# Patient Record
Sex: MALE | Race: BLACK OR AFRICAN AMERICAN | Employment: FULL TIME | URBAN - METROPOLITAN AREA
[De-identification: names, ages, dates, MRNs, and addresses within clinical notes are randomized per-mention and may not be internally consistent; named-entity substitution may affect disease eponyms.]

---

## 2022-07-23 ENCOUNTER — HOSPITAL ENCOUNTER (INPATIENT)
Age: 32
LOS: 2 days | Discharge: HOME | DRG: 139 | End: 2022-07-27
Attending: EMERGENCY MEDICINE | Admitting: INTERNAL MEDICINE
Payer: MEDICAID

## 2022-07-23 ENCOUNTER — APPOINTMENT (OUTPATIENT)
Dept: GENERAL RADIOLOGY | Age: 32
DRG: 139 | End: 2022-07-23
Payer: MEDICAID

## 2022-07-23 DIAGNOSIS — K92.1 MELENA: ICD-10-CM

## 2022-07-23 DIAGNOSIS — A41.9 SEPTICEMIA (HCC): ICD-10-CM

## 2022-07-23 DIAGNOSIS — J18.9 PNEUMONIA OF BOTH LUNGS DUE TO INFECTIOUS ORGANISM, UNSPECIFIED PART OF LUNG: Primary | ICD-10-CM

## 2022-07-23 PROBLEM — E87.1 HYPONATREMIA: Status: ACTIVE | Noted: 2022-07-23

## 2022-07-23 PROBLEM — R79.89 ELEVATED LFTS: Status: ACTIVE | Noted: 2022-07-23

## 2022-07-23 LAB
ABSOLUTE EOS #: 0 K/UL (ref 0–0.4)
ABSOLUTE IMMATURE GRANULOCYTE: 0.27 K/UL (ref 0–0.3)
ABSOLUTE LYMPH #: 0.89 K/UL (ref 1–4.8)
ABSOLUTE MONO #: 0.36 K/UL (ref 0.2–0.8)
ALBUMIN SERPL-MCNC: 2.7 G/DL (ref 3.5–5.2)
ALP BLD-CCNC: 64 U/L (ref 40–129)
ALT SERPL-CCNC: 68 U/L (ref 5–41)
ANION GAP SERPL CALCULATED.3IONS-SCNC: 13 MMOL/L (ref 9–17)
ANION GAP SERPL CALCULATED.3IONS-SCNC: 15 MMOL/L (ref 9–17)
AST SERPL-CCNC: 203 U/L
BASOPHILS # BLD: 0 %
BASOPHILS ABSOLUTE: 0 K/UL (ref 0–0.2)
BILIRUB SERPL-MCNC: 0.96 MG/DL (ref 0.3–1.2)
BILIRUBIN DIRECT: 0.65 MG/DL
BILIRUBIN, INDIRECT: 0.31 MG/DL (ref 0–1)
BUN BLDV-MCNC: 22 MG/DL (ref 6–20)
BUN BLDV-MCNC: 26 MG/DL (ref 6–20)
BUN/CREAT BLD: 22 (ref 9–20)
BUN/CREAT BLD: 23 (ref 9–20)
CALCIUM SERPL-MCNC: 7.9 MG/DL (ref 8.6–10.4)
CALCIUM SERPL-MCNC: 8.2 MG/DL (ref 8.6–10.4)
CHLORIDE BLD-SCNC: 92 MMOL/L (ref 98–107)
CHLORIDE BLD-SCNC: 96 MMOL/L (ref 98–107)
CO2: 17 MMOL/L (ref 20–31)
CO2: 20 MMOL/L (ref 20–31)
CREAT SERPL-MCNC: 0.97 MG/DL (ref 0.7–1.2)
CREAT SERPL-MCNC: 1.16 MG/DL (ref 0.7–1.2)
EOSINOPHILS RELATIVE PERCENT: 0 % (ref 1–4)
GFR AFRICAN AMERICAN: >60 ML/MIN
GFR AFRICAN AMERICAN: >60 ML/MIN
GFR NON-AFRICAN AMERICAN: >60 ML/MIN
GFR NON-AFRICAN AMERICAN: >60 ML/MIN
GFR SERPL CREATININE-BSD FRML MDRD: ABNORMAL ML/MIN/{1.73_M2}
GFR SERPL CREATININE-BSD FRML MDRD: ABNORMAL ML/MIN/{1.73_M2}
GLUCOSE BLD-MCNC: 111 MG/DL (ref 70–99)
GLUCOSE BLD-MCNC: 121 MG/DL (ref 70–99)
HCT VFR BLD CALC: 38.4 % (ref 40.7–50.3)
HEMOGLOBIN: 12.7 G/DL (ref 13–17)
IMMATURE GRANULOCYTES: 3 %
LACTIC ACID, SEPSIS: 1 MMOL/L (ref 0.5–1.9)
LACTIC ACID, SEPSIS: 1.4 MMOL/L (ref 0.5–1.9)
LIPASE: 16 U/L (ref 13–60)
LYMPHOCYTES # BLD: 10 % (ref 24–44)
MCH RBC QN AUTO: 26.3 PG (ref 25.2–33.5)
MCHC RBC AUTO-ENTMCNC: 33.1 G/DL (ref 28.4–34.8)
MCV RBC AUTO: 79.5 FL (ref 82.6–102.9)
MONOCYTES # BLD: 4 % (ref 1–7)
MONONUCLEOSIS SCREEN: NEGATIVE
MORPHOLOGY: ABNORMAL
MORPHOLOGY: ABNORMAL
NRBC AUTOMATED: 0 PER 100 WBC
PDW BLD-RTO: 13.8 % (ref 11.8–14.4)
PLATELET # BLD: 115 K/UL (ref 138–453)
PMV BLD AUTO: 11.1 FL (ref 8.1–13.5)
POTASSIUM SERPL-SCNC: 3.7 MMOL/L (ref 3.7–5.3)
POTASSIUM SERPL-SCNC: 3.9 MMOL/L (ref 3.7–5.3)
RBC # BLD: 4.83 M/UL (ref 4.21–5.77)
S PYO AG THROAT QL: NEGATIVE
SARS-COV-2, RAPID: NOT DETECTED
SEG NEUTROPHILS: 83 % (ref 36–66)
SEGMENTED NEUTROPHILS ABSOLUTE COUNT: 7.38 K/UL (ref 1.8–7.7)
SODIUM BLD-SCNC: 124 MMOL/L (ref 135–144)
SODIUM BLD-SCNC: 129 MMOL/L (ref 135–144)
SOURCE: NORMAL
SPECIMEN DESCRIPTION: NORMAL
TOTAL PROTEIN: 6.8 G/DL (ref 6.4–8.3)
TROPONIN, HIGH SENSITIVITY: 7 NG/L (ref 0–22)
TROPONIN, HIGH SENSITIVITY: 8 NG/L (ref 0–22)
WBC # BLD: 8.9 K/UL (ref 3.5–11.3)

## 2022-07-23 PROCEDURE — G0378 HOSPITAL OBSERVATION PER HR: HCPCS

## 2022-07-23 PROCEDURE — 6360000002 HC RX W HCPCS: Performed by: EMERGENCY MEDICINE

## 2022-07-23 PROCEDURE — 99222 1ST HOSP IP/OBS MODERATE 55: CPT | Performed by: NURSE PRACTITIONER

## 2022-07-23 PROCEDURE — 96361 HYDRATE IV INFUSION ADD-ON: CPT

## 2022-07-23 PROCEDURE — 6370000000 HC RX 637 (ALT 250 FOR IP): Performed by: NURSE PRACTITIONER

## 2022-07-23 PROCEDURE — 6360000002 HC RX W HCPCS: Performed by: NURSE PRACTITIONER

## 2022-07-23 PROCEDURE — 87635 SARS-COV-2 COVID-19 AMP PRB: CPT

## 2022-07-23 PROCEDURE — G0378 HOSPITAL OBSERVATION PER HR: HCPCS | Performed by: INTERNAL MEDICINE

## 2022-07-23 PROCEDURE — 96375 TX/PRO/DX INJ NEW DRUG ADDON: CPT

## 2022-07-23 PROCEDURE — 87880 STREP A ASSAY W/OPTIC: CPT

## 2022-07-23 PROCEDURE — 96365 THER/PROPH/DIAG IV INF INIT: CPT

## 2022-07-23 PROCEDURE — 6370000000 HC RX 637 (ALT 250 FOR IP): Performed by: EMERGENCY MEDICINE

## 2022-07-23 PROCEDURE — 85025 COMPLETE CBC W/AUTO DIFF WBC: CPT

## 2022-07-23 PROCEDURE — 84484 ASSAY OF TROPONIN QUANT: CPT

## 2022-07-23 PROCEDURE — 96368 THER/DIAG CONCURRENT INF: CPT

## 2022-07-23 PROCEDURE — 99285 EMERGENCY DEPT VISIT HI MDM: CPT

## 2022-07-23 PROCEDURE — 71045 X-RAY EXAM CHEST 1 VIEW: CPT

## 2022-07-23 PROCEDURE — 93005 ELECTROCARDIOGRAM TRACING: CPT | Performed by: EMERGENCY MEDICINE

## 2022-07-23 PROCEDURE — 83605 ASSAY OF LACTIC ACID: CPT

## 2022-07-23 PROCEDURE — 2580000003 HC RX 258: Performed by: NURSE PRACTITIONER

## 2022-07-23 PROCEDURE — 86308 HETEROPHILE ANTIBODY SCREEN: CPT

## 2022-07-23 PROCEDURE — 36415 COLL VENOUS BLD VENIPUNCTURE: CPT

## 2022-07-23 PROCEDURE — 2580000003 HC RX 258: Performed by: EMERGENCY MEDICINE

## 2022-07-23 PROCEDURE — 80048 BASIC METABOLIC PNL TOTAL CA: CPT

## 2022-07-23 PROCEDURE — 87040 BLOOD CULTURE FOR BACTERIA: CPT

## 2022-07-23 PROCEDURE — 96374 THER/PROPH/DIAG INJ IV PUSH: CPT

## 2022-07-23 PROCEDURE — 94640 AIRWAY INHALATION TREATMENT: CPT

## 2022-07-23 PROCEDURE — 80076 HEPATIC FUNCTION PANEL: CPT

## 2022-07-23 PROCEDURE — 83690 ASSAY OF LIPASE: CPT

## 2022-07-23 PROCEDURE — 96372 THER/PROPH/DIAG INJ SC/IM: CPT

## 2022-07-23 RX ORDER — BENZONATATE 100 MG/1
100 CAPSULE ORAL 3 TIMES DAILY PRN
Status: DISCONTINUED | OUTPATIENT
Start: 2022-07-23 | End: 2022-07-27 | Stop reason: HOSPADM

## 2022-07-23 RX ORDER — ACETAMINOPHEN 325 MG/1
650 TABLET ORAL EVERY 4 HOURS PRN
Status: DISCONTINUED | OUTPATIENT
Start: 2022-07-23 | End: 2022-07-27 | Stop reason: HOSPADM

## 2022-07-23 RX ORDER — IPRATROPIUM BROMIDE AND ALBUTEROL SULFATE 2.5; .5 MG/3ML; MG/3ML
1 SOLUTION RESPIRATORY (INHALATION) 2 TIMES DAILY
Status: DISCONTINUED | OUTPATIENT
Start: 2022-07-24 | End: 2022-07-27 | Stop reason: HOSPADM

## 2022-07-23 RX ORDER — SODIUM CHLORIDE 9 MG/ML
INJECTION, SOLUTION INTRAVENOUS CONTINUOUS
Status: DISCONTINUED | OUTPATIENT
Start: 2022-07-23 | End: 2022-07-27 | Stop reason: HOSPADM

## 2022-07-23 RX ORDER — SODIUM CHLORIDE 9 MG/ML
INJECTION, SOLUTION INTRAVENOUS PRN
Status: DISCONTINUED | OUTPATIENT
Start: 2022-07-23 | End: 2022-07-27 | Stop reason: HOSPADM

## 2022-07-23 RX ORDER — GUAIFENESIN 600 MG/1
600 TABLET, EXTENDED RELEASE ORAL 2 TIMES DAILY
Status: DISCONTINUED | OUTPATIENT
Start: 2022-07-23 | End: 2022-07-27 | Stop reason: HOSPADM

## 2022-07-23 RX ORDER — SODIUM CHLORIDE 0.9 % (FLUSH) 0.9 %
5-40 SYRINGE (ML) INJECTION EVERY 12 HOURS SCHEDULED
Status: DISCONTINUED | OUTPATIENT
Start: 2022-07-23 | End: 2022-07-27 | Stop reason: HOSPADM

## 2022-07-23 RX ORDER — ONDANSETRON 4 MG/1
4 TABLET, ORALLY DISINTEGRATING ORAL EVERY 8 HOURS PRN
Status: DISCONTINUED | OUTPATIENT
Start: 2022-07-23 | End: 2022-07-27 | Stop reason: HOSPADM

## 2022-07-23 RX ORDER — ACETAMINOPHEN 650 MG/1
650 SUPPOSITORY RECTAL EVERY 6 HOURS PRN
Status: DISCONTINUED | OUTPATIENT
Start: 2022-07-23 | End: 2022-07-27 | Stop reason: HOSPADM

## 2022-07-23 RX ORDER — ALBUTEROL SULFATE 2.5 MG/3ML
2.5 SOLUTION RESPIRATORY (INHALATION)
Status: DISCONTINUED | OUTPATIENT
Start: 2022-07-23 | End: 2022-07-27 | Stop reason: HOSPADM

## 2022-07-23 RX ORDER — ONDANSETRON 2 MG/ML
4 INJECTION INTRAMUSCULAR; INTRAVENOUS ONCE
Status: COMPLETED | OUTPATIENT
Start: 2022-07-23 | End: 2022-07-23

## 2022-07-23 RX ORDER — 0.9 % SODIUM CHLORIDE 0.9 %
1000 INTRAVENOUS SOLUTION INTRAVENOUS ONCE
Status: COMPLETED | OUTPATIENT
Start: 2022-07-23 | End: 2022-07-23

## 2022-07-23 RX ORDER — SODIUM CHLORIDE 0.9 % (FLUSH) 0.9 %
10 SYRINGE (ML) INJECTION PRN
Status: DISCONTINUED | OUTPATIENT
Start: 2022-07-23 | End: 2022-07-27 | Stop reason: HOSPADM

## 2022-07-23 RX ORDER — AZITHROMYCIN 250 MG/1
500 TABLET, FILM COATED ORAL EVERY 24 HOURS
Status: COMPLETED | OUTPATIENT
Start: 2022-07-24 | End: 2022-07-26

## 2022-07-23 RX ORDER — ONDANSETRON 2 MG/ML
4 INJECTION INTRAMUSCULAR; INTRAVENOUS EVERY 6 HOURS PRN
Status: DISCONTINUED | OUTPATIENT
Start: 2022-07-23 | End: 2022-07-27 | Stop reason: HOSPADM

## 2022-07-23 RX ORDER — ENOXAPARIN SODIUM 100 MG/ML
40 INJECTION SUBCUTANEOUS DAILY
Status: DISCONTINUED | OUTPATIENT
Start: 2022-07-23 | End: 2022-07-27 | Stop reason: HOSPADM

## 2022-07-23 RX ORDER — IPRATROPIUM BROMIDE AND ALBUTEROL SULFATE 2.5; .5 MG/3ML; MG/3ML
1 SOLUTION RESPIRATORY (INHALATION)
Status: DISCONTINUED | OUTPATIENT
Start: 2022-07-24 | End: 2022-07-23

## 2022-07-23 RX ORDER — ACETAMINOPHEN 500 MG
1000 TABLET ORAL ONCE
Status: COMPLETED | OUTPATIENT
Start: 2022-07-23 | End: 2022-07-23

## 2022-07-23 RX ADMIN — AZITHROMYCIN MONOHYDRATE 500 MG: 500 INJECTION, POWDER, LYOPHILIZED, FOR SOLUTION INTRAVENOUS at 17:39

## 2022-07-23 RX ADMIN — SODIUM CHLORIDE 1000 ML: 9 INJECTION, SOLUTION INTRAVENOUS at 15:46

## 2022-07-23 RX ADMIN — GUAIFENESIN 600 MG: 600 TABLET ORAL at 20:54

## 2022-07-23 RX ADMIN — ENOXAPARIN SODIUM 40 MG: 100 INJECTION SUBCUTANEOUS at 20:54

## 2022-07-23 RX ADMIN — CEFTRIAXONE SODIUM 1000 MG: 1 INJECTION, POWDER, FOR SOLUTION INTRAMUSCULAR; INTRAVENOUS at 16:48

## 2022-07-23 RX ADMIN — ACETAMINOPHEN 1000 MG: 500 TABLET ORAL at 15:40

## 2022-07-23 RX ADMIN — SODIUM CHLORIDE: 9 INJECTION, SOLUTION INTRAVENOUS at 21:00

## 2022-07-23 RX ADMIN — SODIUM CHLORIDE, PRESERVATIVE FREE 10 ML: 5 INJECTION INTRAVENOUS at 20:55

## 2022-07-23 RX ADMIN — ONDANSETRON 4 MG: 2 INJECTION INTRAMUSCULAR; INTRAVENOUS at 15:47

## 2022-07-23 RX ADMIN — ACETAMINOPHEN 650 MG: 325 TABLET ORAL at 23:21

## 2022-07-23 RX ADMIN — ALBUTEROL SULFATE 2.5 MG: 2.5 SOLUTION RESPIRATORY (INHALATION) at 20:50

## 2022-07-23 ASSESSMENT — ENCOUNTER SYMPTOMS
BACK PAIN: 1
SORE THROAT: 0
COUGH: 1
SHORTNESS OF BREATH: 1
NAUSEA: 0
EYE REDNESS: 0
DIARRHEA: 0
EYE DISCHARGE: 0
VOMITING: 0
RHINORRHEA: 0
ABDOMINAL PAIN: 0
VOMITING: 1
COLOR CHANGE: 0
CONSTIPATION: 0
NAUSEA: 1
CHEST TIGHTNESS: 0
SHORTNESS OF BREATH: 0

## 2022-07-23 ASSESSMENT — PAIN DESCRIPTION - LOCATION: LOCATION: BACK;ABDOMEN;HEAD

## 2022-07-23 ASSESSMENT — PAIN SCALES - GENERAL
PAINLEVEL_OUTOF10: 10
PAINLEVEL_OUTOF10: 9
PAINLEVEL_OUTOF10: 10

## 2022-07-23 ASSESSMENT — PAIN - FUNCTIONAL ASSESSMENT: PAIN_FUNCTIONAL_ASSESSMENT: 0-10

## 2022-07-23 NOTE — ED PROVIDER NOTES
EMERGENCY DEPARTMENT ENCOUNTER    Pt Name: Brennon Finn  MRN: [id number]  [place] 1990  Date of evaluation: 7/23/22  CHIEF COMPLAINT       Chief Complaint   Patient presents with    Cough    Shortness of Breath    Headache    Fever     HISTORY OF PRESENT ILLNESS   This is a 33year-old that presents with complaints of cough, fever chills body aches and generally not feeling well. Patient states that he was seen a few days ago at another facility, he had a COVID swab and some other blood work that was performed. Patient was negative for influenza. He denies drug abuse. He states that he continues to feel ill, cough sputum production, sore throat congestion and just generally not feeling well. REVIEW OF SYSTEMS     Review of Systems   Constitutional:  Positive for chills, fatigue and fever. HENT:  Negative for rhinorrhea and sore throat. Eyes:  Negative for discharge, redness and visual disturbance. Respiratory:  Positive for cough and shortness of breath. Cardiovascular:  Negative for chest pain, palpitations and leg swelling. Gastrointestinal:  Negative for diarrhea, nausea and vomiting. Genitourinary:  Negative for dysuria and hematuria. Musculoskeletal:  Negative for arthralgias, myalgias and neck pain. Skin:  Negative for color change and rash. Neurological:  Negative for seizures, weakness and headaches. Psychiatric/Behavioral:  Negative for hallucinations, self-injury and suicidal ideas. PASTMEDICAL HISTORY   History reviewed. No pertinent past medical history. Past Problem List  Patient Active Problem List   Diagnosis Code    Multifocal pneumonia J18.9     SURGICAL HISTORY     History reviewed. No pertinent surgical history. CURRENT MEDICATIONS       Previous Medications    No medications on file     ALLERGIES     has No Known Allergies. FAMILY HISTORY     has no family status information on file.       SOCIAL HISTORY       Social History     Tobacco Use Smoking status: Former     Types: Cigarettes    Smokeless tobacco: Never   Substance Use Topics    Alcohol use: Not Currently    Drug use: Not Currently     PHYSICAL EXAM     INITIAL VITALS: /82   Pulse (!) 127   Temp (!) 102 °F (38.9 °C) (Oral)   Resp 18   Ht 5' 7\" (1.702 m)   Wt 150 lb (68 kg)   SpO2 92%   BMI 23.49 kg/m²    Physical Exam  Constitutional:       Appearance: Normal appearance. He is well-developed. He is ill-appearing. He is not toxic-appearing. HENT:      Head: Normocephalic and atraumatic. Eyes:      Conjunctiva/sclera: Conjunctivae normal.      Pupils: Pupils are equal, round, and reactive to light. Neck:      Trachea: Trachea normal.   Cardiovascular:      Rate and Rhythm: Regular rhythm. Tachycardia present. Heart sounds: S1 normal and S2 normal. No murmur heard. Pulmonary:      Effort: Pulmonary effort is normal. Tachypnea present. No accessory muscle usage or respiratory distress. Breath sounds: Normal breath sounds. Chest:      Chest wall: No deformity or tenderness. Abdominal:      General: Bowel sounds are normal. There is no distension or abdominal bruit. Palpations: Abdomen is not rigid. Tenderness: There is no abdominal tenderness. There is no guarding or rebound. Musculoskeletal:      Cervical back: Normal range of motion and neck supple. Skin:     General: Skin is warm. Findings: No rash. Neurological:      Mental Status: He is alert and oriented to person, place, and time. GCS: GCS eye subscore is 4. GCS verbal subscore is 5. GCS motor subscore is 6. Psychiatric:         Speech: Speech normal.       MEDICAL DECISION MAKIN-year-old male presents with complaints of cough and shortness of breath, plan is basic labs lactic acid COVID swab and reevaluation. 4:49 PM EDT  Patient's x-ray shows evidence of a multifocal pneumonia, the patient is tachycardic and tachypneic, and meets sepsis criteria.   Patient's COVID was negative, plan is IV antibiotics blood cultures admission to the hospitalist and reevaluation. CRITICAL CARE:       PROCEDURES:    Critical Care    Date/Time: 7/23/2022 4:50 PM  Performed by: Ivis Felix MD  Authorized by: Dee Dumont MD     Critical care provider statement:     Critical care time (minutes):  36    Critical care time was exclusive of:  Separately billable procedures and treating other patients and teaching time    Critical care was necessary to treat or prevent imminent or life-threatening deterioration of the following conditions:  Sepsis    Critical care was time spent personally by me on the following activities:  Discussions with primary provider, evaluation of patient's response to treatment, ordering and review of laboratory studies and ordering and review of radiographic studies    DIAGNOSTIC RESULTS   EKG:All EKG's are interpreted by the Emergency Department Physician who either signs or Co-signs this chart in the absence of a cardiologist.    Patient's EKG shows sinus tachycardia with rate of 101 CT QRS QTC normal is unremarkable, the patient has normal axis no ST elevation or depressions, no significant T wave changes. Nonspecific EKG. RADIOLOGY:All plain film, CT, MRI, and formal ultrasound images (except ED bedside ultrasound) are read by the radiologist, see reports below, unless otherwisenoted in MDM or here. XR CHEST PORTABLE   Final Result   Extensive consolidation along the medial right lung, suspicious for   multifocal pneumonia. Recommend follow-up to resolution. LABS: All lab results were reviewed by myself, and all abnormals are listed below.   Labs Reviewed   CBC WITH AUTO DIFFERENTIAL - Abnormal; Notable for the following components:       Result Value    Hemoglobin 12.7 (*)     Hematocrit 38.4 (*)     MCV 79.5 (*)     Platelets 125 (*)     All other components within normal limits   BASIC METABOLIC PANEL - Abnormal; Notable for the following components:    Glucose 111 (*)     BUN 26 (*)     Bun/Cre Ratio 22 (*)     Calcium 8.2 (*)     Sodium 124 (*)     Chloride 92 (*)     CO2 17 (*)     All other components within normal limits   HEPATIC FUNCTION PANEL - Abnormal; Notable for the following components:    Albumin 2.7 (*)     ALT 68 (*)      (*)     Bilirubin, Direct 0.65 (*)     All other components within normal limits   STREP SCREEN GROUP A THROAT   COVID-19, RAPID   CULTURE, BLOOD 1   CULTURE, BLOOD 1   MONONUCLEOSIS SCREEN   LIPASE   TROPONIN   LACTATE, SEPSIS   LACTATE, SEPSIS   TROPONIN   TROPONIN   TROPONIN       EMERGENCY DEPARTMENTCOURSE:         Vitals:    Vitals:    07/23/22 1500 07/23/22 1649   BP: 135/82    Pulse: (!) 127    Resp: 18    Temp: (!) 102.5 °F (39.2 °C) (!) 102 °F (38.9 °C)   TempSrc:  Oral   SpO2: 92%    Weight: 150 lb (68 kg)    Height: 5' 7\" (1.702 m)        The patient was given the following medications while in the emergency department:  Orders Placed This Encounter   Medications    acetaminophen (TYLENOL) tablet 1,000 mg    ondansetron (ZOFRAN) injection 4 mg    0.9 % sodium chloride bolus    cefTRIAXone (ROCEPHIN) 1000 mg IVPB in 50 mL D5W minibag     Order Specific Question:   Antimicrobial Indications     Answer:   Pneumonia (CAP)    azithromycin (ZITHROMAX) 500 mg in D5W 250ml addavial     Order Specific Question:   Antimicrobial Indications     Answer:   Pneumonia (CAP)     CONSULTS:  IP CONSULT TO HOSPITALIST    FINAL IMPRESSION      1. Pneumonia of both lungs due to infectious organism, unspecified part of lung    2. Septicemia [facility])          DISPOSITION/PLAN   DISPOSITION Admitted 07/23/2022 04:45:30 PM      PATIENT REFERRED TO:  No follow-up provider specified. DISCHARGE MEDICATIONS:  New Prescriptions    No medications on file     The care is provided during an unprecedented national emergency due to the novel coronavirus, COVID 19.   MD Dilcia Neal, MD  07/23/22 3987

## 2022-07-23 NOTE — ED NOTES
Commode and wipes placed at bs; pt up to commode; denies dizzy/lightheadedness. Agrees to put call light on when done.       Mark Cortes RN  07/23/22 5145
Pt calls out using call light stating \"I feel like I'm gonna throw up\". Pt provided basin.       Jordy Rodriguez RN  07/23/22 9808
Pt given crackers and ice chips.      Leon Guerra, TARA  07/23/22 0613
Pt returned to bed from commode; tolerated well.       Che Miller RN  07/23/22 9061
Pt states full sensation in bilat feet again.       Jim Castillo, TARA  07/23/22 6261
Pt states toes of bilat feet just started to go numb. RN checked CMS; pt states slightly decreased sensation in bilat feet but otherwise CMS intact bilat.       Donovan Chaidez, RN  07/23/22 9132
Star Moore  07/23/22 TARA Wagner  07/23/22 1940

## 2022-07-23 NOTE — H&P
[facility]  Office: [address], DO, Katie Press, DO, Lina Haidering, DO, Justina Villalobos Blood, DO, Justine Duran MD, Kelsi Myers MD, Jossy Ty MD, Brent Segal MD,  Caprice Varghese MD, Sole Reynolds MD, Mriiam Diop, DO, Liborio Boss MD,  Alfreda Singh MD, Ale Wellington MD, Kelley Madera, DO, Salvatore Rodriguez MD, Lyly Sethi MD, Annabelle Bolaños MD, Marck Whipple, DO, Acacia Baum MD, Jamie Cota MD, Sherry Ramirez, CNP,  Lio Blocker, CNP, Gerard Rees, CNP, Madiha Case, CNP, Roylene Goodell, PA-C, Sherine Shah, DNP, Arelis Larry, CNP, Tala Prakash, CNP, Zuri Daniels, CNP, Tavia Cornea, CNP, Favian Calderón, CNS, Korin Hassan, [facility], Leonor Layne, CNP, Lydia Southern, CNP, Carvel Pro, CNP, Kim Elias, [facility]    HISTORY AND PHYSICAL EXAMINATION            Date:   7/23/2022  Patient name:  Leila Saini  Date of admission:  7/23/2022  3:04 PM  MRN:   [id number]  Account:  [de-identified]  YOB: 1990  PCP:    No primary care provider on file. Room:   [place] [address]  Code Status:    Full    Chief Complaint:     Chief Complaint   Patient presents with    Cough    Shortness of Breath    Headache    Fever     History Obtained From:     patient, electronic medical record    History of Present Illness:     Patient presents to the emergency room today with complaints of a stomachache, mid back pain and a headache. Patient states that his symptoms started approximately 5 days ago. Patient states that he also had a fever during that time, but did not check it because he does not have a thermometer. Patient states that he was taking Tylenol intermittently for the fever. The patient states that the fever would improve and then come right back. Patient has a productive cough with yellow sputum.   Patient has been nauseated and vomiting for 2 days, and patient has not eaten or drank anything in 4 days. Of note, patient was evaluated at [facility] ER on 7/18/2022 and 7/19/2022. During those ED visits, patient's WBC count was 16.8 and 19.5. Patient's flu swab, rapid strep and COVID swab were both negative. Patient was discharged and advised to use Tylenol and Motrin for a viral upper respiratory illness. Patient has no significant past medical history and takes no medications at home. Throughout the emergency room evaluation it was noted that the patient sodium level was 124. LFTs are elevated. WBC 8.9. CXR:   Extensive consolidation along the medial right lung, suspicious for   multifocal pneumonia. Recommend follow-up to resolution. Past Medical History:     History reviewed. No pertinent past medical history. Past Surgical History:     History reviewed. No pertinent surgical history. Medications Prior to Admission:     Prior to Admission medications    Not on File        Allergies:     Patient has no known allergies. Social History:     Tobacco:    reports that he has quit smoking. His smoking use included cigarettes. He has never used smokeless tobacco.  Alcohol:      reports that he does not currently use alcohol. Drug Use:  reports that he does not currently use drugs. Family History:     Family History   Problem Relation Age of Onset    No Known Problems Mother     No Known Problems Father     No Known Problems Brother     No Known Problems Brother        Review of Systems:     Positive and Negative as described in HPI. Review of Systems   Constitutional:  Positive for activity change, appetite change, chills, fatigue and fever. HENT:  Positive for congestion. Respiratory:  Positive for cough. Negative for chest tightness and shortness of breath. Cardiovascular: Negative. Gastrointestinal:  Positive for nausea and vomiting. Negative for abdominal pain, constipation and diarrhea.    Endocrine: Negative for cold intolerance and polyuria. Genitourinary:  Negative for difficulty urinating. Musculoskeletal:  Positive for back pain and myalgias. Skin:  Negative for color change and wound. Neurological:  Positive for headaches. Negative for dizziness, weakness, light-headedness and numbness. Psychiatric/Behavioral:  Negative for confusion. Physical Exam:   /75   Pulse 91   Temp 99.9 °F (37.7 °C) (Oral)   Resp 22   Ht 5' 7\" (1.702 m)   Wt 150 lb (68 kg)   SpO2 93%   BMI 23.49 kg/m²   Temp (24hrs), Av.5 °F (38.6 °C), Min:99.9 °F (37.7 °C), Max:102.5 °F (39.2 °C)    No results for input(s): POCGLU in the last 72 hours. No intake or output data in the 24 hours ending 22 1906    Physical Exam  Vitals and nursing note reviewed. Constitutional:       General: He is not in acute distress. Appearance: Normal appearance. HENT:      Head: Normocephalic. Mouth/Throat:      Mouth: Mucous membranes are moist.   Eyes:      Pupils: Pupils are equal, round, and reactive to light. Cardiovascular:      Rate and Rhythm: Normal rate and regular rhythm. Pulses: Normal pulses. Heart sounds: Normal heart sounds. No murmur heard. No friction rub. No gallop. Pulmonary:      Effort: Pulmonary effort is normal. No respiratory distress. Breath sounds: Examination of the right-lower field reveals decreased breath sounds and rales. Examination of the left-lower field reveals decreased breath sounds and rales. Decreased breath sounds and rales present. No wheezing. Abdominal:      General: Bowel sounds are normal. There is no distension. Palpations: Abdomen is soft. Tenderness: There is no abdominal tenderness. Musculoskeletal:         General: No swelling. Normal range of motion. Cervical back: Normal range of motion. Skin:     General: Skin is warm and dry. Capillary Refill: Capillary refill takes less than 2 seconds.    Neurological:      General: No focal NEGATIVE NEGATIVE   Strep Screen Group A Throat    Collection Time: 07/23/22  3:40 PM    Specimen: Throat   Result Value Ref Range    Source . THROAT SWAB     Strep A Ag NEGATIVE NEGATIVE   COVID-19, Rapid    Collection Time: 07/23/22  3:40 PM    Specimen: Nasopharyngeal Swab   Result Value Ref Range    Specimen Description . NASOPHARYNGEAL SWAB     SARS-CoV-2, Rapid Not Detected Not Detected   Hepatic Function Panel    Collection Time: 07/23/22  3:40 PM   Result Value Ref Range    Albumin 2.7 (L) 3.5 - 5.2 g/dL    Alkaline Phosphatase 64 40 - 129 U/L    ALT 68 (H) 5 - 41 U/L     (H) <40 U/L    Total Bilirubin 0.96 0.3 - 1.2 mg/dL    Bilirubin, Direct 0.65 (H) <0.31 mg/dL    Bilirubin, Indirect 0.31 0.00 - 1.00 mg/dL    Total Protein 6.8 6.4 - 8.3 g/dL   Lipase    Collection Time: 07/23/22  3:40 PM   Result Value Ref Range    Lipase 16 13 - 60 U/L   Troponin    Collection Time: 07/23/22  3:40 PM   Result Value Ref Range    Troponin, High Sensitivity 8 0 - 22 ng/L   Lactate, Sepsis    Collection Time: 07/23/22  3:58 PM   Result Value Ref Range    Lactic Acid, Sepsis 1.4 0.5 - 1.9 mmol/L   EKG 12 Lead    Collection Time: 07/23/22  4:07 PM   Result Value Ref Range    Ventricular Rate 101 BPM    Atrial Rate 101 BPM    P-R Interval 142 ms    QRS Duration 94 ms    Q-T Interval 340 ms    QTc Calculation (Bazett) 440 ms    P Axis 64 degrees    R Axis 58 degrees    T Axis 47 degrees   Culture, Blood 1    Collection Time: 07/23/22  4:25 PM    Specimen: Blood   Result Value Ref Range    Specimen Description . BLOOD     Special Requests R AC 10ML     Culture NO GROWTH <24 HRS    Culture, Blood 1    Collection Time: 07/23/22  4:40 PM    Specimen: Blood   Result Value Ref Range    Specimen Description . BLOOD     Special Requests LAC 10ML     Culture NO GROWTH <24 HRS    Lactate, Sepsis    Collection Time: 07/23/22  5:45 PM   Result Value Ref Range    Lactic Acid, Sepsis 1.0 0.5 - 1.9 mmol/L   Troponin    Collection Time: 07/23/22  5:45 PM   Result Value Ref Range    Troponin, High Sensitivity 8 0 - 22 ng/L       Imaging/Diagnostics:  XR CHEST PORTABLE    Result Date: 7/23/2022  Extensive consolidation along the medial right lung, suspicious for multifocal pneumonia. Recommend follow-up to resolution. Assessment :      Hospital Problems             Last Modified POA    * (Principal) Multifocal pneumonia 7/23/2022 Yes    Hyponatremia 7/23/2022 Yes    Elevated LFTs 7/23/2022 Yes       Plan:     Patient status observation in the  Med/Surge    Pneumonia  IV hydration  Antibiotics ordered  As needed breathing treatments  Tessalon Perles and Mucinex  Encourage use of I-S and Acapella. Supplemental O2 as needed  Sputum culture  Chest x-ray in the a.m. Hyponatremia  IV hydration  Repeat sodium level today  Elevated LFTs  Monitor labs in the morning  Adult diet  Monitor a.m. labs    Consultations:   IP CONSULT TO HOSPITALIST    On this date 7/23/2022 I have spent 27 minutes reviewing previous notes, test results and face to face with the patient discussing the diagnosis and importance of compliance with the treatment plan as well as documenting on the day of the visit. At least 50% of the time documented was spent with the patient to provide counseling and/or coordination of care. NORA Maya CNP  7/23/2022  7:06 PM    Copy sent to Dr. Kateryna Santos primary care provider on file.

## 2022-07-24 ENCOUNTER — APPOINTMENT (OUTPATIENT)
Dept: GENERAL RADIOLOGY | Age: 32
DRG: 139 | End: 2022-07-24
Payer: MEDICAID

## 2022-07-24 PROBLEM — E87.6 HYPOKALEMIA: Status: ACTIVE | Noted: 2022-07-24

## 2022-07-24 LAB
ABSOLUTE EOS #: <0.03 K/UL (ref 0–0.44)
ABSOLUTE IMMATURE GRANULOCYTE: 0.1 K/UL (ref 0–0.3)
ABSOLUTE LYMPH #: 0.85 K/UL (ref 1.1–3.7)
ABSOLUTE MONO #: 0.67 K/UL (ref 0.1–1.2)
ALBUMIN SERPL-MCNC: 2.4 G/DL (ref 3.5–5.2)
ALP BLD-CCNC: 61 U/L (ref 40–129)
ALT SERPL-CCNC: 62 U/L (ref 5–41)
ANION GAP SERPL CALCULATED.3IONS-SCNC: 11 MMOL/L (ref 9–17)
AST SERPL-CCNC: 189 U/L
BASOPHILS # BLD: 0 % (ref 0–2)
BASOPHILS ABSOLUTE: <0.03 K/UL (ref 0–0.2)
BILIRUB SERPL-MCNC: 0.89 MG/DL (ref 0.3–1.2)
BILIRUBIN DIRECT: 0.62 MG/DL
BILIRUBIN, INDIRECT: 0.27 MG/DL (ref 0–1)
BUN BLDV-MCNC: 21 MG/DL (ref 6–20)
BUN/CREAT BLD: 23 (ref 9–20)
CALCIUM SERPL-MCNC: 7.8 MG/DL (ref 8.6–10.4)
CHLORIDE BLD-SCNC: 98 MMOL/L (ref 98–107)
CO2: 20 MMOL/L (ref 20–31)
CREAT SERPL-MCNC: 0.93 MG/DL (ref 0.7–1.2)
EOSINOPHILS RELATIVE PERCENT: 0 % (ref 1–4)
GFR AFRICAN AMERICAN: >60 ML/MIN
GFR NON-AFRICAN AMERICAN: >60 ML/MIN
GFR SERPL CREATININE-BSD FRML MDRD: ABNORMAL ML/MIN/{1.73_M2}
GLUCOSE BLD-MCNC: 110 MG/DL (ref 70–99)
HCT VFR BLD CALC: 36.6 % (ref 40.7–50.3)
HEMOGLOBIN: 11.7 G/DL (ref 13–17)
IMMATURE GRANULOCYTES: 1 %
LACTIC ACID, SEPSIS: 1.2 MMOL/L (ref 0.5–1.9)
LACTIC ACID, SEPSIS: 1.6 MMOL/L (ref 0.5–1.9)
LEGIONELLA PNEUMOPHILIA AG, URINE: NEGATIVE
LYMPHOCYTES # BLD: 11 % (ref 24–43)
MCH RBC QN AUTO: 25.9 PG (ref 25.2–33.5)
MCHC RBC AUTO-ENTMCNC: 32 G/DL (ref 28.4–34.8)
MCV RBC AUTO: 81.2 FL (ref 82.6–102.9)
MONOCYTES # BLD: 9 % (ref 3–12)
NRBC AUTOMATED: 0 PER 100 WBC
OSMOLALITY URINE: 770 MOSM/KG (ref 80–1300)
PDW BLD-RTO: 14.2 % (ref 11.8–14.4)
PLATELET # BLD: 117 K/UL (ref 138–453)
PMV BLD AUTO: 12.1 FL (ref 8.1–13.5)
POTASSIUM SERPL-SCNC: 3.6 MMOL/L (ref 3.7–5.3)
RBC # BLD: 4.51 M/UL (ref 4.21–5.77)
RBC # BLD: ABNORMAL 10*6/UL
SEG NEUTROPHILS: 79 % (ref 36–65)
SEGMENTED NEUTROPHILS ABSOLUTE COUNT: 6.25 K/UL (ref 1.5–8.1)
SERUM OSMOLALITY: 274 MOSM/KG (ref 275–295)
SODIUM BLD-SCNC: 128 MMOL/L (ref 135–144)
SODIUM BLD-SCNC: 128 MMOL/L (ref 135–144)
SODIUM BLD-SCNC: 129 MMOL/L (ref 135–144)
SODIUM BLD-SCNC: 129 MMOL/L (ref 135–144)
SODIUM,UR: 78 MMOL/L
TOTAL PROTEIN: 6.2 G/DL (ref 6.4–8.3)
WBC # BLD: 7.9 K/UL (ref 3.5–11.3)

## 2022-07-24 PROCEDURE — 96361 HYDRATE IV INFUSION ADD-ON: CPT

## 2022-07-24 PROCEDURE — 80076 HEPATIC FUNCTION PANEL: CPT

## 2022-07-24 PROCEDURE — 94761 N-INVAS EAR/PLS OXIMETRY MLT: CPT

## 2022-07-24 PROCEDURE — 6370000000 HC RX 637 (ALT 250 FOR IP): Performed by: NURSE PRACTITIONER

## 2022-07-24 PROCEDURE — 36415 COLL VENOUS BLD VENIPUNCTURE: CPT

## 2022-07-24 PROCEDURE — 83935 ASSAY OF URINE OSMOLALITY: CPT

## 2022-07-24 PROCEDURE — 71046 X-RAY EXAM CHEST 2 VIEWS: CPT

## 2022-07-24 PROCEDURE — G0378 HOSPITAL OBSERVATION PER HR: HCPCS

## 2022-07-24 PROCEDURE — 84300 ASSAY OF URINE SODIUM: CPT

## 2022-07-24 PROCEDURE — 83930 ASSAY OF BLOOD OSMOLALITY: CPT

## 2022-07-24 PROCEDURE — 99232 SBSQ HOSP IP/OBS MODERATE 35: CPT | Performed by: INTERNAL MEDICINE

## 2022-07-24 PROCEDURE — 84295 ASSAY OF SERUM SODIUM: CPT

## 2022-07-24 PROCEDURE — 87449 NOS EACH ORGANISM AG IA: CPT

## 2022-07-24 PROCEDURE — 85025 COMPLETE CBC W/AUTO DIFF WBC: CPT

## 2022-07-24 PROCEDURE — 83605 ASSAY OF LACTIC ACID: CPT

## 2022-07-24 PROCEDURE — 94640 AIRWAY INHALATION TREATMENT: CPT

## 2022-07-24 PROCEDURE — 80048 BASIC METABOLIC PNL TOTAL CA: CPT

## 2022-07-24 PROCEDURE — 2580000003 HC RX 258: Performed by: NURSE PRACTITIONER

## 2022-07-24 PROCEDURE — 96366 THER/PROPH/DIAG IV INF ADDON: CPT

## 2022-07-24 PROCEDURE — 6360000002 HC RX W HCPCS: Performed by: NURSE PRACTITIONER

## 2022-07-24 RX ORDER — POTASSIUM CHLORIDE 20 MEQ/1
40 TABLET, EXTENDED RELEASE ORAL PRN
Status: DISCONTINUED | OUTPATIENT
Start: 2022-07-24 | End: 2022-07-27 | Stop reason: HOSPADM

## 2022-07-24 RX ORDER — POTASSIUM CHLORIDE 7.45 MG/ML
10 INJECTION INTRAVENOUS PRN
Status: DISCONTINUED | OUTPATIENT
Start: 2022-07-24 | End: 2022-07-27 | Stop reason: HOSPADM

## 2022-07-24 RX ADMIN — GUAIFENESIN 600 MG: 600 TABLET ORAL at 08:33

## 2022-07-24 RX ADMIN — IPRATROPIUM BROMIDE AND ALBUTEROL SULFATE 1 AMPULE: 2.5; .5 SOLUTION RESPIRATORY (INHALATION) at 09:45

## 2022-07-24 RX ADMIN — AZITHROMYCIN MONOHYDRATE 500 MG: 250 TABLET ORAL at 16:10

## 2022-07-24 RX ADMIN — ACETAMINOPHEN 650 MG: 325 TABLET ORAL at 18:16

## 2022-07-24 RX ADMIN — IPRATROPIUM BROMIDE AND ALBUTEROL SULFATE 1 AMPULE: 2.5; .5 SOLUTION RESPIRATORY (INHALATION) at 20:22

## 2022-07-24 RX ADMIN — ACETAMINOPHEN 650 MG: 325 TABLET ORAL at 10:20

## 2022-07-24 RX ADMIN — GUAIFENESIN 600 MG: 600 TABLET ORAL at 20:23

## 2022-07-24 RX ADMIN — CEFTRIAXONE SODIUM 1000 MG: 1 INJECTION, POWDER, FOR SOLUTION INTRAMUSCULAR; INTRAVENOUS at 16:14

## 2022-07-24 NOTE — PLAN OF CARE
Problem: Discharge Planning  Goal: Discharge to home or other facility with appropriate resources  Outcome: Progressing  Flowsheets (Taken 7/23/2022 2020)  Discharge to home or other facility with appropriate resources: Identify barriers to discharge with patient and caregiver     Problem: Pain  Goal: Verbalizes/displays adequate comfort level or baseline comfort level  Outcome: Progressing     Problem: ABCDS Injury Assessment  Goal: Absence of physical injury  Outcome: Progressing

## 2022-07-24 NOTE — PROGRESS NOTES
[facility]  Office: [address], DO, Mya Dunbar, DO, Chyna Pritchard, DO, [facility], DO, Andry Stanford MD, Marcie Quintanilla MD, Brooklynn Paris MD, Rasheeda Garcia MD,  Angie Allen MD, Guillermina hTomas MD, Danielle Peterson, DO, Hermes Hancock MD,  Tomas Hercules MD, To Garcia MD, Jyoti Sevilla, DO, Lizz Tafoya MD, Laurence Gastelum MD, Nicky Levine MD, Darryl Souza, DO, Amado Castro MD, Peg Sandoval MD, Alina Sanchez, CNP,  Sherly Wilde, CNP, Karolina Alvarado, CNP, Yaquelin Fernandez, CNP, Rosa Maria Loving PA-C, Ayaz Hung, [facility], Soel Jiang, CNP, Jimmie Thurston, CNP, Harrison Cosby, CNP, Chanda Mendoza, CNP, Ceferino Bernal, CNS, Ellie Swain, [facility], Mary Polanco, CNP, Anne Joel, CNP, Lilian Martel, CNP, Abram Lopez, [facility]    Progress Note    7/24/2022    3:16 PM    Name:   Jaylen Pino  MRN:     [id number]     [place]:      [de-identified]   Room:   2005/2005-01   Day:  0  Admit Date:  7/23/2022  3:04 PM    PCP:   No primary care provider on file. Code Status:  Full Code    Subjective:     C/C:   Chief Complaint   Patient presents with    Cough    Shortness of Breath    Headache    Fever     Interval History Status: .   Still having cough with yellow expectoration  States he had diarrhea for 3 days but no bowel movement today  T-max 102.9  Being treated with antibiotics for multifocal pneumonia  Sodium 128-129, potassium 3.6  Liver enzymes are elevated  Brief History:   Patient presents to the emergency room today with complaints of a stomachache, mid back pain and a headache. Patient states that his symptoms started approximately 5 days ago. Patient states that he also had a fever during that time, but did not check it because he does not have a thermometer. Patient states that he was taking Tylenol intermittently for the fever.   The patient states that the fever would improve and then come right back. Patient has a productive cough with yellow sputum. Patient has been nauseated and vomiting for 2 days, and patient has not eaten or drank anything in 4 days. Of note, patient was evaluated at [facility] on 7/18/2022 and 7/19/2022. During those ED visits, patient's WBC count was 16.8 and 19.5. Patient's flu swab, rapid strep and COVID swab were both negative. Patient was discharged and advised to use Tylenol and Motrin for a viral upper respiratory illness. Patient has no significant past medical history and takes no medications at home. Throughout the emergency room evaluation it was noted that the patient sodium level was 124. LFTs are elevated. WBC 8.9. CXR:   Extensive consolidation along the medial right lung, suspicious for   multifocal pneumonia. Recommend follow-up to resolution         Review of Systems:     Constitutional:  + for chills, fevers, sweats  Respiratory:  + for cough with yellow expectoration, dyspnea on exertion, denies wheezing  Cardiovascular:  negative for chest pain, chest pressure/discomfort, lower extremity edema, palpitations  Gastrointestinal:  negative for abdominal pain, constipation, +diarrhea, denies nausea, vomiting  Neurological:  negative for dizziness, headache    Medications:      Allergies:  No Known Allergies    Current Meds:   Scheduled Meds:    sodium chloride flush  5-40 mL IntraVENous 2 times per day    enoxaparin  40 mg SubCUTAneous Daily    cefTRIAXone (ROCEPHIN) IV  1,000 mg IntraVENous Q24H    And    azithromycin  500 mg Oral Q24H    guaiFENesin  600 mg Oral BID    ipratropium-albuterol  1 ampule Inhalation BID     Continuous Infusions:    sodium chloride 75 mL/hr at 07/24/22 0520    sodium chloride       PRN Meds: potassium chloride **OR** potassium alternative oral replacement **OR** potassium chloride, sodium chloride flush, sodium chloride, ondansetron **OR** ondansetron, magnesium hydroxide, acetaminophen, albuterol, benzonatate, acetaminophen    Data:     Past Medical History:   has no past medical history on file. Social History:   reports that he has been smoking cigarettes. He has been smoking an average of 1 pack per day. He has never used smokeless tobacco. He reports that he does not currently use alcohol. He reports that he does not currently use drugs. Family History:   Family History   Problem Relation Age of Onset    No Known Problems Mother     No Known Problems Father     No Known Problems Brother     No Known Problems Brother        Vitals:  BP (!) 115/52   Pulse 87   Temp 99.5 °F (37.5 °C) (Oral)   Resp 18   Ht 5' 7\" (1.702 m)   Wt 150 lb (68 kg)   SpO2 94%   BMI 23.49 kg/m²   Temp (24hrs), Av.5 °F (38.1 °C), Min:98.5 °F (36.9 °C), Max:102.9 °F (39.4 °C)    No results for input(s): POCGLU in the last 72 hours. I/O (24Hr):     Intake/Output Summary (Last 24 hours) at 2022 1516  Last data filed at 2022 0520  Gross per 24 hour   Intake 931.92 ml   Output --   Net 931.92 ml       Labs:  Hematology:  Recent Labs     22  1540 22  0403   WBC 8.9 7.9   RBC 4.83 4.51   HGB 12.7* 11.7*   HCT 38.4* 36.6*   MCV 79.5* 81.2*   MCH 26.3 25.9   MCHC 33.1 32.0   RDW 13.8 14.2   * 117*   MPV 11.1 12.1     Chemistry:  Recent Labs     22  1540 22  1745 22  1935 22  2206 22  0403 22  0751   *  --   --  129* 129* 128*   K 3.9  --   --  3.7 3.6*  --    CL 92*  --   --  96* 98  --    CO2 17*  --   --  20 20  --    GLUCOSE 111*  --   --  121* 110*  --    BUN 26*  --   --  22* 21*  --    CREATININE 1.16  --   --  0.97 0.93  --    ANIONGAP 15  --   --  13 11  --    LABGLOM >60  --   --  >60 >60  --    GFRAA >60  --   --  >60 >60  --    CALCIUM 8.2*  --   --  7.9* 7.8*  --    TROPHS 8 8 7 8  --   --      Recent Labs     22  1540 22  0403   PROT 6.8 6.2*   LABALBU 2.7* 2.4*   * 189*   ALT 68* 62*   ALKPHOS 64 61   BILITOT 0.96 0.89   BILIDIR 0.65* 0.62*   LIPASE 16  --      ABG:No results found for: POCPH, PHART, PH, POCPCO2, KYU9RTA, PCO2, POCPO2, PO2ART, PO2, POCHCO3, IXW3FGD, HCO3, NBEA, PBEA, BEART, BE, THGBART, THB, IRC0KDE, XTUV8XDW, T2OJMWTE, O2SAT, FIO2  Lab Results   Component Value Date/Time    SPECIAL LAC 10ML 07/23/2022 04:40 PM     Lab Results   Component Value Date/Time    CULTURE NO GROWTH 12 HOURS 07/23/2022 04:40 PM       Radiology:  XR CHEST (2 VW)    Result Date: 7/24/2022  Right mid and lower lung infiltrate consistent with pneumonia. XR CHEST PORTABLE    Result Date: 7/23/2022  Extensive consolidation along the medial right lung, suspicious for multifocal pneumonia. Recommend follow-up to resolution.        Physical Examination:        General appearance:  alert, cooperative and no distress, sick looking  Mental Status:  oriented to person, place and time and normal affect  Lungs:  + Bibasilar Rales, no wheezing  Heart:  regular rate and rhythm, no murmur  Abdomen:  soft, nontender, nondistended, normal bowel sounds, no masses, hepatomegaly, splenomegaly  Extremities:  no edema, redness, tenderness in the calves  Skin:  no gross lesions, rashes, induration    Assessment:        Hospital Problems             Last Modified POA    * (Principal) Multifocal pneumonia 7/23/2022 Yes    Hyponatremia 7/23/2022 Yes    Elevated LFTs 7/23/2022 Yes    Hypokalemia 7/24/2022 Yes       Plan:        Continue IV antibiotics with azithromycin and Rocephin  Continue Mucinex 600 mg 2 times daily and aerosols  DVT prophylaxis  Check urine for Legionella antigen  Hyponatremia work-up  Replace electrolytes as needed  Check stool for C. difficile if gets diarrhea again    Aimee Sanchez MD  7/24/2022  3:16 PM

## 2022-07-24 NOTE — RT PROTOCOL NOTE
RT Inhaler-Nebulizer Bronchodilator Protocol Note    There is a bronchodilator order in the chart from a provider indicating to follow the RT Bronchodilator Protocol and there is an Initiate RT Inhaler-Nebulizer Bronchodilator Protocol order as well (see protocol at bottom of note). CXR Findings:  XR CHEST PORTABLE    Result Date: 7/23/2022  Extensive consolidation along the medial right lung, suspicious for multifocal pneumonia. Recommend follow-up to resolution. The findings from the last RT Protocol Assessment were as follows:   History Pulmonary Disease: None or smoker <15 pack years  Respiratory Pattern: Dyspnea on exertion or RR 21-25 bpm  Breath Sounds: Slightly diminished and/or crackles  Cough: Strong, spontaneous, non-productive  Indication for Bronchodilator Therapy:    Bronchodilator Assessment Score: 4    Aerosolized bronchodilator medication orders have been revised according to the RT Inhaler-Nebulizer Bronchodilator Protocol below. Respiratory Therapist to perform RT Therapy Protocol Assessment initially then follow the protocol. Repeat RT Therapy Protocol Assessment PRN for score 0-3 or on second treatment, BID, and PRN for scores above 3. No Indications - adjust the frequency to every 6 hours PRN wheezing or bronchospasm, if no treatments needed after 48 hours then discontinue using Per Protocol order mode. If indication present, adjust the RT bronchodilator orders based on the Bronchodilator Assessment Score as indicated below. Use Inhaler orders unless patient has one or more of the following: on home nebulizer, not able to hold breath for 10 seconds, is not alert and oriented, cannot activate and use MDI correctly, or respiratory rate 25 breaths per minute or more, then use the equivalent nebulizer order(s) with same Frequency and PRN reasons based on the score. If a patient is on this medication at home then do not decrease Frequency below that used at home.     0-3 - enter or revise RT bronchodilator order(s) to equivalent RT Bronchodilator order with Frequency of every 4 hours PRN for wheezing or increased work of breathing using Per Protocol order mode. 4-6 - enter or revise RT Bronchodilator order(s) to two equivalent RT bronchodilator orders with one order with BID Frequency and one order with Frequency of every 4 hours PRN wheezing or increased work of breathing using Per Protocol order mode. 7-10 - enter or revise RT Bronchodilator order(s) to two equivalent RT bronchodilator orders with one order with TID Frequency and one order with Frequency of every 4 hours PRN wheezing or increased work of breathing using Per Protocol order mode. 11-13 - enter or revise RT Bronchodilator order(s) to one equivalent RT bronchodilator order with QID Frequency and an Albuterol order with Frequency of every 4 hours PRN wheezing or increased work of breathing using Per Protocol order mode. Greater than 13 - enter or revise RT Bronchodilator order(s) to one equivalent RT bronchodilator order with every 4 hours Frequency and an Albuterol order with Frequency of every 2 hours PRN wheezing or increased work of breathing using Per Protocol order mode. RT to enter RT Home Evaluation for COPD & MDI Assessment order using Per Protocol order mode.     Electronically signed by Suki Nascimento RCP on 7/23/2022 at 8:40 PM

## 2022-07-24 NOTE — RT PROTOCOL NOTE
RT Inhaler-Nebulizer Bronchodilator Protocol Note    There is a bronchodilator order in the chart from a provider indicating to follow the RT Bronchodilator Protocol and there is an Initiate RT Inhaler-Nebulizer Bronchodilator Protocol order as well (see protocol at bottom of note). CXR Findings:  XR CHEST PORTABLE    Result Date: 7/23/2022  Extensive consolidation along the medial right lung, suspicious for multifocal pneumonia. Recommend follow-up to resolution. The findings from the last RT Protocol Assessment were as follows:   History Pulmonary Disease: None or smoker <15 pack years  Respiratory Pattern: Dyspnea on exertion or RR 21-25 bpm  Breath Sounds: Slightly diminished and/or crackles  Cough: Strong, spontaneous, non-productive  Indication for Bronchodilator Therapy: Decreased or absent breath sounds  Bronchodilator Assessment Score: 4    Aerosolized bronchodilator medication orders have been revised according to the RT Inhaler-Nebulizer Bronchodilator Protocol below. Respiratory Therapist to perform RT Therapy Protocol Assessment initially then follow the protocol. Repeat RT Therapy Protocol Assessment PRN for score 0-3 or on second treatment, BID, and PRN for scores above 3. No Indications - adjust the frequency to every 6 hours PRN wheezing or bronchospasm, if no treatments needed after 48 hours then discontinue using Per Protocol order mode. If indication present, adjust the RT bronchodilator orders based on the Bronchodilator Assessment Score as indicated below. Use Inhaler orders unless patient has one or more of the following: on home nebulizer, not able to hold breath for 10 seconds, is not alert and oriented, cannot activate and use MDI correctly, or respiratory rate 25 breaths per minute or more, then use the equivalent nebulizer order(s) with same Frequency and PRN reasons based on the score.   If a patient is on this medication at home then do not decrease Frequency below that used at home. 0-3 - enter or revise RT bronchodilator order(s) to equivalent RT Bronchodilator order with Frequency of every 4 hours PRN for wheezing or increased work of breathing using Per Protocol order mode. 4-6 - enter or revise RT Bronchodilator order(s) to two equivalent RT bronchodilator orders with one order with BID Frequency and one order with Frequency of every 4 hours PRN wheezing or increased work of breathing using Per Protocol order mode. 7-10 - enter or revise RT Bronchodilator order(s) to two equivalent RT bronchodilator orders with one order with TID Frequency and one order with Frequency of every 4 hours PRN wheezing or increased work of breathing using Per Protocol order mode. 11-13 - enter or revise RT Bronchodilator order(s) to one equivalent RT bronchodilator order with QID Frequency and an Albuterol order with Frequency of every 4 hours PRN wheezing or increased work of breathing using Per Protocol order mode. Greater than 13 - enter or revise RT Bronchodilator order(s) to one equivalent RT bronchodilator order with every 4 hours Frequency and an Albuterol order with Frequency of every 2 hours PRN wheezing or increased work of breathing using Per Protocol order mode. RT to enter RT Home Evaluation for COPD & MDI Assessment order using Per Protocol order mode.     Electronically signed by Jane Khanna RCP on 7/24/2022 at 9:51 AM

## 2022-07-24 NOTE — PROGRESS NOTES
Pt admitted to room 2005 per wheelchair in stable condition from ED  Oriented to room and surroundings  Bed in lowest position, wheels locked, 2/4 side rails up  Call light in reach, room free of clutter, adequate lighting provided  Denies any further questions at this time  Instructed to call out with any questions/concerns/new onset of pain and/or n/v   White board updated  Continue to monitor with hourly rounding  Non-skid socks on/at bedside

## 2022-07-24 NOTE — CARE COORDINATION
Case Management Initial Discharge Plan  Leila Saini,             Met with:patient to discuss discharge plans. Information verified: address, contacts, phone number, , insurance Yes  PCP: No primary care provider on file. Date of last visit:  [address] list given    Insurance Provider: HELP to [employer] (does not have card)    Discharge Planning    Living Arrangements:  Spouse/Significant Other, Children   Support Systems:  Spouse/Significant Other    Home has 1 stories  3 stairs to climb to get into front door, 0 stairs to climb to reach second floor  Location of bedroom/bathroom in home  - main floor    Patient able to perform ADL's:Independent    Current Services (outpatient & in home) none  DME equipment: none  DME provider: N/A    Pharmacy: [facility] and [facility]     Potential Assistance Needed:  N/A    Patient agreeable to home care: No  [place] of choice provided:  n/a    Prior SNF/Rehab Placement and Facility: No  Agreeable to SNF/Rehab: No  [place] of choice provided: n/a   Evaluation: n/a    Expected Discharge date:     Patient expects to be discharged to: Follow Up Appointment: Best Day/ Time: Tuesday PM    Transportation provider: faith  Transportation arrangements needed for discharge: No    Readmission Risk              Risk of Unplanned Readmission:  0             Does patient have a readmission risk score greater than 14?: No  If yes, follow-up appointment must be made within 7 days of discharge. Goal of Care:       Discharge Plan: Met with patient at bedside. Lives with girlfriend and her kids, independent and walks to and from work. Has had fever and vomiting since Monday. Being treated with IV Rocephin and Zithromax for multifocal pneumonia.  [address] PCP list given. Pt states has [employer] [place] insurance and does not have card. VM left with HELP to verify insurance coverage Monday.   Continue to follow and no home needs anticipated.               Electronically signed by Theodora Giles RN on 7/24/22 at 9:21 AM EDT

## 2022-07-24 NOTE — FLOWSHEET NOTE
[facility] NOTE    Room # 2005/2005-01   Name: Janeth Wells              Reason for visit: Routine    I visited the patient. Admit Date & Time: 7/23/2022  3:04 PM    Assessment:  Janeth Wells is a 33 y.o. male. Upon entering the room patient states well, states no major needs or prayers. Patient kindly declines Spiritual Care visit.  leaves prayer card for patient for possible follow up as needed. Intervention:   provided a ministry presence. Outcome:  Patient grateful for the visit. Plan:  Chaplains will remain available to offer spiritual and emotional support as needed. Electronically signed by Arthur Madrigal.  Chaplain Trevor, on 7/24/2022 at 5:53 PM.  Adiel      07/24/22 3187   Encounter Summary   Service Provided For: Patient not available   Referral/Consult From: [facility]   Support System Unknown   Last Encounter  07/24/22   Complexity of Encounter Low   Begin Time 0335   End Time  0340   Total Time Calculated 5 min   Encounter    Type Initial Screen/Assessment   Assessment/Intervention/Outcome   Assessment Unable to assess   Intervention Sustaining Presence/Ministry of presence;Prayer (assurance of)/[place]   Outcome Refused/Declined

## 2022-07-25 PROBLEM — R19.7 DIARRHEA: Status: ACTIVE | Noted: 2022-07-25

## 2022-07-25 PROBLEM — F17.200 SMOKER: Status: ACTIVE | Noted: 2022-07-25

## 2022-07-25 LAB
ANION GAP SERPL CALCULATED.3IONS-SCNC: 12 MMOL/L (ref 9–17)
BUN BLDV-MCNC: 18 MG/DL (ref 6–20)
BUN/CREAT BLD: 21 (ref 9–20)
CALCIUM SERPL-MCNC: 7.9 MG/DL (ref 8.6–10.4)
CHLORIDE BLD-SCNC: 97 MMOL/L (ref 98–107)
CO2: 20 MMOL/L (ref 20–31)
CREAT SERPL-MCNC: 0.86 MG/DL (ref 0.7–1.2)
DATE, STOOL #1: ABNORMAL
DIRECT EXAM: NORMAL
DIRECT EXAM: NORMAL
EKG ATRIAL RATE: 101 BPM
EKG P AXIS: 64 DEGREES
EKG P-R INTERVAL: 142 MS
EKG Q-T INTERVAL: 340 MS
EKG QRS DURATION: 94 MS
EKG QTC CALCULATION (BAZETT): 440 MS
EKG R AXIS: 58 DEGREES
EKG T AXIS: 47 DEGREES
EKG VENTRICULAR RATE: 101 BPM
GFR AFRICAN AMERICAN: >60 ML/MIN
GFR NON-AFRICAN AMERICAN: >60 ML/MIN
GFR SERPL CREATININE-BSD FRML MDRD: ABNORMAL ML/MIN/{1.73_M2}
GLUCOSE BLD-MCNC: 100 MG/DL (ref 70–99)
HAV IGM SER IA-ACNC: NONREACTIVE
HEMOCCULT SP1 STL QL: POSITIVE
HEPATITIS B CORE IGM ANTIBODY: NONREACTIVE
HEPATITIS B SURFACE ANTIGEN: NONREACTIVE
HEPATITIS C ANTIBODY: NONREACTIVE
LACTIC ACID, SEPSIS: 1.1 MMOL/L (ref 0.5–1.9)
LACTIC ACID, SEPSIS: 1.2 MMOL/L (ref 0.5–1.9)
POTASSIUM SERPL-SCNC: 3.8 MMOL/L (ref 3.7–5.3)
SODIUM BLD-SCNC: 128 MMOL/L (ref 135–144)
SODIUM BLD-SCNC: 129 MMOL/L (ref 135–144)
SODIUM BLD-SCNC: 130 MMOL/L (ref 135–144)
SODIUM BLD-SCNC: 130 MMOL/L (ref 135–144)
SPECIMEN DESCRIPTION: NORMAL
TIME, STOOL #1: 1130

## 2022-07-25 PROCEDURE — 1200000000 HC SEMI PRIVATE

## 2022-07-25 PROCEDURE — 6370000000 HC RX 637 (ALT 250 FOR IP): Performed by: NURSE PRACTITIONER

## 2022-07-25 PROCEDURE — 80074 ACUTE HEPATITIS PANEL: CPT

## 2022-07-25 PROCEDURE — 82270 OCCULT BLOOD FECES: CPT

## 2022-07-25 PROCEDURE — 6360000002 HC RX W HCPCS: Performed by: NURSE PRACTITIONER

## 2022-07-25 PROCEDURE — 99232 SBSQ HOSP IP/OBS MODERATE 35: CPT | Performed by: INTERNAL MEDICINE

## 2022-07-25 PROCEDURE — 84295 ASSAY OF SERUM SODIUM: CPT

## 2022-07-25 PROCEDURE — G0378 HOSPITAL OBSERVATION PER HR: HCPCS

## 2022-07-25 PROCEDURE — 2580000003 HC RX 258: Performed by: NURSE PRACTITIONER

## 2022-07-25 PROCEDURE — 94761 N-INVAS EAR/PLS OXIMETRY MLT: CPT

## 2022-07-25 PROCEDURE — 36415 COLL VENOUS BLD VENIPUNCTURE: CPT

## 2022-07-25 PROCEDURE — 87324 CLOSTRIDIUM AG IA: CPT

## 2022-07-25 PROCEDURE — 87205 SMEAR GRAM STAIN: CPT

## 2022-07-25 PROCEDURE — 87070 CULTURE OTHR SPECIMN AEROBIC: CPT

## 2022-07-25 PROCEDURE — 94640 AIRWAY INHALATION TREATMENT: CPT

## 2022-07-25 PROCEDURE — 6370000000 HC RX 637 (ALT 250 FOR IP): Performed by: INTERNAL MEDICINE

## 2022-07-25 PROCEDURE — 83605 ASSAY OF LACTIC ACID: CPT

## 2022-07-25 PROCEDURE — 96361 HYDRATE IV INFUSION ADD-ON: CPT

## 2022-07-25 PROCEDURE — 87449 NOS EACH ORGANISM AG IA: CPT

## 2022-07-25 PROCEDURE — 80048 BASIC METABOLIC PNL TOTAL CA: CPT

## 2022-07-25 RX ORDER — ACETAMINOPHEN 325 MG/1
650 TABLET ORAL EVERY 4 HOURS PRN
COMMUNITY

## 2022-07-25 RX ORDER — NICOTINE 21 MG/24HR
1 PATCH, TRANSDERMAL 24 HOURS TRANSDERMAL DAILY
Status: DISCONTINUED | OUTPATIENT
Start: 2022-07-25 | End: 2022-07-27 | Stop reason: HOSPADM

## 2022-07-25 RX ORDER — AMOXICILLIN AND CLAVULANATE POTASSIUM 875; 125 MG/1; MG/1
1 TABLET, FILM COATED ORAL 2 TIMES DAILY
Status: ON HOLD | COMMUNITY
End: 2022-07-27 | Stop reason: HOSPADM

## 2022-07-25 RX ORDER — IBUPROFEN 200 MG
600 TABLET ORAL 2 TIMES DAILY PRN
Status: ON HOLD | COMMUNITY
End: 2022-07-27 | Stop reason: HOSPADM

## 2022-07-25 RX ORDER — PREDNISONE 20 MG/1
20 TABLET ORAL 2 TIMES DAILY
COMMUNITY

## 2022-07-25 RX ADMIN — ACETAMINOPHEN 650 MG: 325 TABLET ORAL at 12:17

## 2022-07-25 RX ADMIN — SODIUM CHLORIDE, PRESERVATIVE FREE 10 ML: 5 INJECTION INTRAVENOUS at 08:43

## 2022-07-25 RX ADMIN — GUAIFENESIN 600 MG: 600 TABLET ORAL at 08:40

## 2022-07-25 RX ADMIN — SODIUM CHLORIDE: 9 INJECTION, SOLUTION INTRAVENOUS at 03:34

## 2022-07-25 RX ADMIN — BENZONATATE 100 MG: 100 CAPSULE ORAL at 16:43

## 2022-07-25 RX ADMIN — AZITHROMYCIN MONOHYDRATE 500 MG: 250 TABLET ORAL at 16:33

## 2022-07-25 RX ADMIN — CEFTRIAXONE SODIUM 1000 MG: 1 INJECTION, POWDER, FOR SOLUTION INTRAMUSCULAR; INTRAVENOUS at 16:40

## 2022-07-25 RX ADMIN — ALBUTEROL SULFATE 2.5 MG: 2.5 SOLUTION RESPIRATORY (INHALATION) at 17:00

## 2022-07-25 RX ADMIN — IPRATROPIUM BROMIDE AND ALBUTEROL SULFATE 1 AMPULE: 2.5; .5 SOLUTION RESPIRATORY (INHALATION) at 07:33

## 2022-07-25 RX ADMIN — BENZONATATE 100 MG: 100 CAPSULE ORAL at 08:40

## 2022-07-25 RX ADMIN — SODIUM CHLORIDE: 9 INJECTION, SOLUTION INTRAVENOUS at 16:38

## 2022-07-25 RX ADMIN — ACETAMINOPHEN 650 MG: 325 TABLET ORAL at 20:44

## 2022-07-25 RX ADMIN — GUAIFENESIN 600 MG: 600 TABLET ORAL at 20:44

## 2022-07-25 NOTE — PLAN OF CARE
Problem: Discharge Planning  Goal: Discharge to home or other facility with appropriate resources  7/25/2022 1224 by Durward Libman, RN  Outcome: Progressing  Flowsheets (Taken 7/25/2022 0825)  Discharge to home or other facility with appropriate resources:   Identify barriers to discharge with patient and caregiver   Arrange for needed discharge resources and transportation as appropriate   Identify discharge learning needs (meds, wound care, etc)  7/25/2022 0047 by Jossy Scott RN  Outcome: Progressing  Flowsheets (Taken 7/24/2022 1900)  Discharge to home or other facility with appropriate resources: Identify barriers to discharge with patient and caregiver     Problem: Pain  Goal: Verbalizes/displays adequate comfort level or baseline comfort level  7/25/2022 1224 by Durward Libman, RN  Outcome: Progressing  7/25/2022 0047 by Jossy Scott RN  Outcome: Progressing     Problem: ABCDS Injury Assessment  Goal: Absence of physical injury  7/25/2022 1224 by Durward Libman, RN  Outcome: Progressing  Flowsheets (Taken 7/25/2022 0048 by Jossy Scott RN)  Absence of Physical Injury: Implement safety measures based on patient assessment  7/25/2022 0047 by Jossy Scott RN  Outcome: Progressing

## 2022-07-25 NOTE — CARE COORDINATION
HELP saw pt and verified he has [place] Medicaid. GI consulted for OB positive stools and diarrhea. C-diff pending.

## 2022-07-25 NOTE — RT PROTOCOL NOTE
RT Inhaler-Nebulizer Bronchodilator Protocol Note    There is a bronchodilator order in the chart from a provider indicating to follow the RT Bronchodilator Protocol and there is an Initiate RT Inhaler-Nebulizer Bronchodilator Protocol order as well (see protocol at bottom of note). CXR Findings:  XR CHEST PORTABLE    Result Date: 7/23/2022  Extensive consolidation along the medial right lung, suspicious for multifocal pneumonia. Recommend follow-up to resolution. The findings from the last RT Protocol Assessment were as follows:   History Pulmonary Disease: None or smoker <15 pack years  Respiratory Pattern: Dyspnea on exertion or RR 21-25 bpm  Breath Sounds: Slightly diminished and/or crackles  Cough: Strong, spontaneous, non-productive  Indication for Bronchodilator Therapy: None  Bronchodilator Assessment Score: 4    Aerosolized bronchodilator medication orders have been revised according to the RT Inhaler-Nebulizer Bronchodilator Protocol below. Respiratory Therapist to perform RT Therapy Protocol Assessment initially then follow the protocol. Repeat RT Therapy Protocol Assessment PRN for score 0-3 or on second treatment, BID, and PRN for scores above 3. No Indications - adjust the frequency to every 6 hours PRN wheezing or bronchospasm, if no treatments needed after 48 hours then discontinue using Per Protocol order mode. If indication present, adjust the RT bronchodilator orders based on the Bronchodilator Assessment Score as indicated below. Use Inhaler orders unless patient has one or more of the following: on home nebulizer, not able to hold breath for 10 seconds, is not alert and oriented, cannot activate and use MDI correctly, or respiratory rate 25 breaths per minute or more, then use the equivalent nebulizer order(s) with same Frequency and PRN reasons based on the score. If a patient is on this medication at home then do not decrease Frequency below that used at home.     0-3 - enter or revise RT bronchodilator order(s) to equivalent RT Bronchodilator order with Frequency of every 4 hours PRN for wheezing or increased work of breathing using Per Protocol order mode. 4-6 - enter or revise RT Bronchodilator order(s) to two equivalent RT bronchodilator orders with one order with BID Frequency and one order with Frequency of every 4 hours PRN wheezing or increased work of breathing using Per Protocol order mode. 7-10 - enter or revise RT Bronchodilator order(s) to two equivalent RT bronchodilator orders with one order with TID Frequency and one order with Frequency of every 4 hours PRN wheezing or increased work of breathing using Per Protocol order mode. 11-13 - enter or revise RT Bronchodilator order(s) to one equivalent RT bronchodilator order with QID Frequency and an Albuterol order with Frequency of every 4 hours PRN wheezing or increased work of breathing using Per Protocol order mode. Greater than 13 - enter or revise RT Bronchodilator order(s) to one equivalent RT bronchodilator order with every 4 hours Frequency and an Albuterol order with Frequency of every 2 hours PRN wheezing or increased work of breathing using Per Protocol order mode. RT to enter RT Home Evaluation for COPD & MDI Assessment order using Per Protocol order mode.     Electronically signed by Shasta Traylor RCP on 7/24/2022 at 8:48 PM

## 2022-07-25 NOTE — PROGRESS NOTES
[facility]  Office: [address], DO, Aleksey Siri, DO, Marilynbernabe Ahumada, DO, Marylou Kauffman Blood, DO, Frank Rai MD, Fabricio Sheppard MD, Jo Hoyt MD, Jason Romero MD,  Gabe Suero MD, Reji Gee MD, Christine Bain, DO, Meek Mahan MD,  Pratibha Vitale MD, Kimberley Theodore MD, Benjamin Palomares, DO, Mario Larson MD, Nhan Lopez MD, Steve Rogel MD, Carla Conner, DO, Jenifer Thomas MD, Dietrich Halsted, MD, Ana Cervantes, CNP,  Arpan Harris, CNP, Zev De Leon, CNP, Doris Hernández, CNP, Blaise Bhatt, PA-C, Tank Villasenor, [facility], Mata Emmanuel, CNP, Deja Jha, CNP, Luis Guerra, CNP, Elizabeth Cleveland, CNP, Ana Erickson, CNS, Monique Short, [facility], Tom Ramírez, CNP, Wil Hood, CNP, Ava Benitez, CNP, Jatin Isaac, [facility]    Progress Note    7/25/2022    1:40 PM    Name:   Sarah Barkley  MRN:     [id number]     [place]:      [de-identified]   Room:   2005/2005-01   Day:  0  Admit Date:  7/23/2022  3:04 PM    PCP:   No primary care provider on file. Code Status:  Full Code    Subjective:     C/C:   Chief Complaint   Patient presents with    Cough    Shortness of Breath    Headache    Fever     Interval History Status: .   Still having cough with yellow expectoration  Again having diarrhea today with dark liquid stools, occult blood came positive  T-max 101.7  Being treated with antibiotics for multifocal pneumonia  Sodium 128-129, Legionella negative  Liver enzymes are elevated  Brief History:   Patient presents to the emergency room today with complaints of a stomachache, mid back pain and a headache. Patient states that his symptoms started approximately 5 days ago. Patient states that he also had a fever during that time, but did not check it because he does not have a thermometer. Patient states that he was taking Tylenol intermittently for the fever.   The patient states that the fever would improve and then come right back. Patient has a productive cough with yellow sputum. Patient has been nauseated and vomiting for 2 days, and patient has not eaten or drank anything in 4 days. Of note, patient was evaluated at [facility] ER on 7/18/2022 and 7/19/2022. During those ED visits, patient's WBC count was 16.8 and 19.5. Patient's flu swab, rapid strep and COVID swab were both negative. Patient was discharged and advised to use Tylenol and Motrin for a viral upper respiratory illness. Patient has no significant past medical history and takes no medications at home. Throughout the emergency room evaluation it was noted that the patient sodium level was 124. LFTs are elevated. WBC 8.9. CXR:   Extensive consolidation along the medial right lung, suspicious for   multifocal pneumonia. Recommend follow-up to resolution         Review of Systems:     Constitutional:  + for chills, fevers, sweats  Respiratory:  + for cough with yellow expectoration, dyspnea on exertion, denies wheezing  Cardiovascular:  negative for chest pain, chest pressure/discomfort, lower extremity edema, palpitations  Gastrointestinal:  negative for abdominal pain, constipation, +diarrhea with dark liquid stools, denies nausea, vomiting  Neurological:  negative for dizziness, headache    Medications:      Allergies:  No Known Allergies    Current Meds:   Scheduled Meds:    nicotine  1 patch TransDERmal Daily    sodium chloride flush  5-40 mL IntraVENous 2 times per day    enoxaparin  40 mg SubCUTAneous Daily    cefTRIAXone (ROCEPHIN) IV  1,000 mg IntraVENous Q24H    And    azithromycin  500 mg Oral Q24H    guaiFENesin  600 mg Oral BID    ipratropium-albuterol  1 ampule Inhalation BID     Continuous Infusions:    sodium chloride 75 mL/hr at 07/25/22 1300    sodium chloride       PRN Meds: potassium chloride **OR** potassium alternative oral replacement **OR** potassium chloride, sodium chloride flush, sodium chloride, ondansetron **OR** ondansetron, magnesium hydroxide, acetaminophen, albuterol, benzonatate, acetaminophen    Data:     Past Medical History:   has no past medical history on file. Social History:   reports that he has been smoking cigarettes. He has been smoking an average of 1 pack per day. He has never used smokeless tobacco. He reports that he does not currently use alcohol. He reports that he does not currently use drugs. Family History:   Family History   Problem Relation Age of Onset    No Known Problems Mother     No Known Problems Father     No Known Problems Brother     No Known Problems Brother        Vitals:  /65   Pulse 82   Temp 99.2 °F (37.3 °C) (Oral)   Resp 18   Ht 5' 7\" (1.702 m)   Wt 138 lb (62.6 kg)   SpO2 94%   BMI 21.61 kg/m²   Temp (24hrs), Av.1 °F (37.8 °C), Min:98.7 °F (37.1 °C), Max:102.7 °F (39.3 °C)    No results for input(s): POCGLU in the last 72 hours. I/O (24Hr):     Intake/Output Summary (Last 24 hours) at 2022 1340  Last data filed at 2022 1300  Gross per 24 hour   Intake 2094.12 ml   Output --   Net 2094.12 ml       Labs:  Hematology:  Recent Labs     22  1540 22  0403   WBC 8.9 7.9   RBC 4.83 4.51   HGB 12.7* 11.7*   HCT 38.4* 36.6*   MCV 79.5* 81.2*   MCH 26.3 25.9   MCHC 33.1 32.0   RDW 13.8 14.2   * 117*   MPV 11.1 12.1     Chemistry:  Recent Labs     22  1745 22  1935 22  2206 22  0403 22  0751 22  2151 22  0350 22  0959   NA  --   --  129* 129*   < > 128* 129* 128*   K  --   --  3.7 3.6*  --   --  3.8  --    CL  --   --  96* 98  --   --  97*  --    CO2  --   --  20 20  --   --  20  --    GLUCOSE  --   --  121* 110*  --   --  100*  --    BUN  --   --  22* 21*  --   --  18  --    CREATININE  --   --  0.97 0.93  --   --  0.86  --    ANIONGAP  --   --  13 11  --   --  12  --    LABGLOM  --   --  >60 >60  --   --  >60  --    GFRAA  --   --  >60 >60  -- --  >60  --    CALCIUM  --   --  7.9* 7.8*  --   --  7.9*  --    TROPHS 8 7 8  --   --   --   --   --     < > = values in this interval not displayed. Recent Labs     07/23/22  1540 07/24/22  0403   PROT 6.8 6.2*   LABALBU 2.7* 2.4*   * 189*   ALT 68* 62*   ALKPHOS 64 61   BILITOT 0.96 0.89   BILIDIR 0.65* 0.62*   LIPASE 16  --      ABG:No results found for: POCPH, PHART, PH, POCPCO2, YHV2QFY, PCO2, POCPO2, PO2ART, PO2, POCHCO3, CLS2IWP, HCO3, NBEA, PBEA, BEART, BE, THGBART, THB, QJA9EZW, OVST2GOS, D6HECGYO, O2SAT, FIO2  Lab Results   Component Value Date/Time    SPECIAL LAC 10ML 07/23/2022 04:40 PM     Lab Results   Component Value Date/Time    CULTURE NO GROWTH 1 DAY 07/23/2022 04:40 PM       Radiology:  XR CHEST (2 VW)    Result Date: 7/24/2022  Right mid and lower lung infiltrate consistent with pneumonia. XR CHEST PORTABLE    Result Date: 7/23/2022  Extensive consolidation along the medial right lung, suspicious for multifocal pneumonia. Recommend follow-up to resolution.        Physical Examination:        General appearance:  alert, cooperative and no distress, sick looking  Mental Status:  oriented to person, place and time and normal affect  Lungs:  + Bibasilar Rales, no wheezing  Heart:  regular rate and rhythm, no murmur  Abdomen:  soft, nontender, nondistended, normal bowel sounds, no masses, hepatomegaly, splenomegaly  Extremities:  no edema, redness, tenderness in the calves  Skin:  no gross lesions, rashes, induration    Assessment:        Hospital Problems             Last Modified POA    * (Principal) Multifocal pneumonia 7/23/2022 Yes    Hyponatremia 7/23/2022 Yes    Elevated LFTs 7/23/2022 Yes    Hypokalemia 7/24/2022 Yes    Diarrhea-dark liquid with occult blood positive 7/25/2022 Yes    Smoker 7/25/2022 Yes     Plan:        Continue IV antibiotics with azithromycin and Rocephin  Continue Mucinex 600 mg 2 times daily and aerosols  Nicotine patch  Check stool for C. Difficile  Consult GI for occult blood positive stools with diarrhea  DVT prophylaxis with EPC cuffs ,hold DVT prophylaxis dose of heparin  Hyponatremia work-up has been done  Replace electrolytes as needed  Consulted GI for occult blood positive stools and diarrhea    Agustina Morales MD  7/25/2022  1:40 PM

## 2022-07-25 NOTE — PROGRESS NOTES
Patient had two loose black tarry stools during the shift so far. Stool was sent for occult blood and C-diff test after verifying with infection disease prevention personnel.

## 2022-07-25 NOTE — PROGRESS NOTES
Transitions of Care Pharmacy Service   Medication Review    The patient's list of current home medications has been reviewed. Source(s) of information: spoke to patient and sure scripts     Based on information provided by the above source(s), I have updated the patient's home med list as described below. I changed or updated the following medications on the patient's home medication list:  Discontinued None      Added acetaminophen (TYLENOL) 325 MG tablet  ibuprofen (ADVIL;MOTRIN) 200 MG tablet  amoxicillin-clavulanate (AUGMENTIN) 875-125 MG per tablet  predniSONE (DELTASONE) 20 MG tablet     Adjusted   None      Other Notes Patient stated that he did not finish is 5 day dose of predniSONE (DELTASONE) 20 MG tablet. Patient is unsure of how many days he took it or what was left. He filled this medication on 7/12/22  Patient stated that he did not finish he 10 dose of amoxicillin-clavulanate (AUGMENTIN) 875-125 MG per tablet. Patient is unsure of how many days he took it or what was left. He filled this medication on 7/12/22           Please feel free to call me with any questions about this encounter. Thank you. This note will be reviewed and co-signed by the [facility] Pharmacist. The pharmacist will review inpatient orders and contact the physician about any discrepancies. Jatin Harrison, pharmacy technician  [facility] Pharm[facility] Service  Phone:  [phone]  Fax: [phone]      Electronically signed by Jatin Harrison on 7/25/2022 at 11:38 AM         Prior to Admission medications    Medication Sig   acetaminophen (TYLENOL) 325 MG tablet Take 650 mg by mouth every 4 hours as needed for Pain   amoxicillin-clavulanate (AUGMENTIN) 875-125 MG per tablet Take 1 tablet by mouth in the morning and 1 tablet before bedtime. For 10 days. predniSONE (DELTASONE) 20 MG tablet Take 20 mg by mouth in the morning and 20 mg before bedtime. For 5 days.    ibuprofen (ADVIL;MOTRIN) 200 MG tablet Take 600 mg by mouth 2 times daily as needed for Pain

## 2022-07-26 ENCOUNTER — APPOINTMENT (OUTPATIENT)
Dept: ULTRASOUND IMAGING | Age: 32
DRG: 139 | End: 2022-07-26
Payer: MEDICAID

## 2022-07-26 ENCOUNTER — ANESTHESIA EVENT (OUTPATIENT)
Dept: OPERATING ROOM | Age: 32
DRG: 139 | End: 2022-07-26
Payer: MEDICAID

## 2022-07-26 PROBLEM — J18.9 PNEUMONIA OF BOTH LUNGS DUE TO INFECTIOUS ORGANISM: Status: ACTIVE | Noted: 2022-07-26

## 2022-07-26 LAB
ALBUMIN SERPL-MCNC: 2.7 G/DL (ref 3.5–5.2)
ALP BLD-CCNC: 91 U/L (ref 40–129)
ALPHA-1 ANTITRYPSIN: 470 MG/DL (ref 90–200)
ALT SERPL-CCNC: 52 U/L (ref 5–41)
ANION GAP SERPL CALCULATED.3IONS-SCNC: 12 MMOL/L (ref 9–17)
AST SERPL-CCNC: 158 U/L
BILIRUB SERPL-MCNC: 0.92 MG/DL (ref 0.3–1.2)
BILIRUBIN DIRECT: 0.53 MG/DL
BILIRUBIN, INDIRECT: 0.39 MG/DL (ref 0–1)
BUN BLDV-MCNC: 11 MG/DL (ref 6–20)
BUN/CREAT BLD: 15 (ref 9–20)
C DIFF AG + TOXIN: NEGATIVE
CALCIUM SERPL-MCNC: 7.9 MG/DL (ref 8.6–10.4)
CERULOPLASMIN: 35 MG/DL (ref 15–30)
CHLORIDE BLD-SCNC: 100 MMOL/L (ref 98–107)
CO2: 22 MMOL/L (ref 20–31)
CREAT SERPL-MCNC: 0.73 MG/DL (ref 0.7–1.2)
CULTURE: ABNORMAL
DATE, STOOL #1: ABNORMAL
DIRECT EXAM: ABNORMAL
FERRITIN: 6647 NG/ML (ref 30–400)
GFR AFRICAN AMERICAN: >60 ML/MIN
GFR NON-AFRICAN AMERICAN: >60 ML/MIN
GFR SERPL CREATININE-BSD FRML MDRD: ABNORMAL ML/MIN/{1.73_M2}
GLUCOSE BLD-MCNC: 100 MG/DL (ref 70–99)
HEMOCCULT SP1 STL QL: POSITIVE
LACTIC ACID, SEPSIS: 1 MMOL/L (ref 0.5–1.9)
POTASSIUM SERPL-SCNC: 3.7 MMOL/L (ref 3.7–5.3)
SODIUM BLD-SCNC: 131 MMOL/L (ref 135–144)
SODIUM BLD-SCNC: 132 MMOL/L (ref 135–144)
SODIUM BLD-SCNC: 134 MMOL/L (ref 135–144)
SODIUM BLD-SCNC: 137 MMOL/L (ref 135–144)
SPECIMEN DESCRIPTION: ABNORMAL
SPECIMEN DESCRIPTION: NORMAL
TIME, STOOL #1: 1800
TOTAL PROTEIN: 6.2 G/DL (ref 6.4–8.3)
TRANSFERRIN: 122 MG/DL (ref 200–360)

## 2022-07-26 PROCEDURE — 2580000003 HC RX 258: Performed by: NURSE PRACTITIONER

## 2022-07-26 PROCEDURE — 1200000000 HC SEMI PRIVATE

## 2022-07-26 PROCEDURE — 83516 IMMUNOASSAY NONANTIBODY: CPT

## 2022-07-26 PROCEDURE — 86225 DNA ANTIBODY NATIVE: CPT

## 2022-07-26 PROCEDURE — 99223 1ST HOSP IP/OBS HIGH 75: CPT | Performed by: INTERNAL MEDICINE

## 2022-07-26 PROCEDURE — 94761 N-INVAS EAR/PLS OXIMETRY MLT: CPT

## 2022-07-26 PROCEDURE — 36415 COLL VENOUS BLD VENIPUNCTURE: CPT

## 2022-07-26 PROCEDURE — 6370000000 HC RX 637 (ALT 250 FOR IP): Performed by: NURSE PRACTITIONER

## 2022-07-26 PROCEDURE — 80048 BASIC METABOLIC PNL TOTAL CA: CPT

## 2022-07-26 PROCEDURE — 84295 ASSAY OF SERUM SODIUM: CPT

## 2022-07-26 PROCEDURE — 99232 SBSQ HOSP IP/OBS MODERATE 35: CPT | Performed by: NURSE PRACTITIONER

## 2022-07-26 PROCEDURE — 82728 ASSAY OF FERRITIN: CPT

## 2022-07-26 PROCEDURE — 6360000002 HC RX W HCPCS: Performed by: NURSE PRACTITIONER

## 2022-07-26 PROCEDURE — 82103 ALPHA-1-ANTITRYPSIN TOTAL: CPT

## 2022-07-26 PROCEDURE — 82390 ASSAY OF CERULOPLASMIN: CPT

## 2022-07-26 PROCEDURE — 80076 HEPATIC FUNCTION PANEL: CPT

## 2022-07-26 PROCEDURE — 94640 AIRWAY INHALATION TREATMENT: CPT

## 2022-07-26 PROCEDURE — 84466 ASSAY OF TRANSFERRIN: CPT

## 2022-07-26 PROCEDURE — 76705 ECHO EXAM OF ABDOMEN: CPT

## 2022-07-26 PROCEDURE — APPSS45 APP SPLIT SHARED TIME 31-45 MINUTES: Performed by: NURSE PRACTITIONER

## 2022-07-26 PROCEDURE — 83605 ASSAY OF LACTIC ACID: CPT

## 2022-07-26 PROCEDURE — 86376 MICROSOMAL ANTIBODY EACH: CPT

## 2022-07-26 PROCEDURE — 86038 ANTINUCLEAR ANTIBODIES: CPT

## 2022-07-26 PROCEDURE — 6370000000 HC RX 637 (ALT 250 FOR IP): Performed by: INTERNAL MEDICINE

## 2022-07-26 PROCEDURE — 82272 OCCULT BLD FECES 1-3 TESTS: CPT

## 2022-07-26 RX ADMIN — GUAIFENESIN 600 MG: 600 TABLET ORAL at 20:49

## 2022-07-26 RX ADMIN — ACETAMINOPHEN 650 MG: 325 TABLET ORAL at 01:25

## 2022-07-26 RX ADMIN — GUAIFENESIN 600 MG: 600 TABLET ORAL at 08:26

## 2022-07-26 RX ADMIN — ACETAMINOPHEN 650 MG: 325 TABLET ORAL at 16:08

## 2022-07-26 RX ADMIN — SODIUM CHLORIDE: 9 INJECTION, SOLUTION INTRAVENOUS at 18:16

## 2022-07-26 RX ADMIN — IPRATROPIUM BROMIDE AND ALBUTEROL SULFATE 1 AMPULE: 2.5; .5 SOLUTION RESPIRATORY (INHALATION) at 20:33

## 2022-07-26 RX ADMIN — SODIUM CHLORIDE, PRESERVATIVE FREE 10 ML: 5 INJECTION INTRAVENOUS at 08:26

## 2022-07-26 RX ADMIN — AZITHROMYCIN MONOHYDRATE 500 MG: 250 TABLET ORAL at 16:08

## 2022-07-26 RX ADMIN — CEFTRIAXONE SODIUM 1000 MG: 1 INJECTION, POWDER, FOR SOLUTION INTRAMUSCULAR; INTRAVENOUS at 16:12

## 2022-07-26 RX ADMIN — BENZONATATE 100 MG: 100 CAPSULE ORAL at 08:26

## 2022-07-26 RX ADMIN — IPRATROPIUM BROMIDE AND ALBUTEROL SULFATE 1 AMPULE: 2.5; .5 SOLUTION RESPIRATORY (INHALATION) at 09:39

## 2022-07-26 RX ADMIN — ACETAMINOPHEN 650 MG: 325 TABLET ORAL at 08:33

## 2022-07-26 NOTE — RT PROTOCOL NOTE
RT Inhaler-Nebulizer Bronchodilator Protocol Note    There is a bronchodilator order in the chart from a provider indicating to follow the RT Bronchodilator Protocol and there is an Initiate RT Inhaler-Nebulizer Bronchodilator Protocol order as well (see protocol at bottom of note). CXR Findings:  No results found. The findings from the last RT Protocol Assessment were as follows:   History Pulmonary Disease: None or smoker <15 pack years  Respiratory Pattern: Dyspnea on exertion or RR 21-25 bpm  Breath Sounds: Slightly diminished and/or crackles  Cough: Strong, spontaneous, non-productive  Indication for Bronchodilator Therapy: None  Bronchodilator Assessment Score: 4    Aerosolized bronchodilator medication orders have been revised according to the RT Inhaler-Nebulizer Bronchodilator Protocol below. Respiratory Therapist to perform RT Therapy Protocol Assessment initially then follow the protocol. Repeat RT Therapy Protocol Assessment PRN for score 0-3 or on second treatment, BID, and PRN for scores above 3. No Indications - adjust the frequency to every 6 hours PRN wheezing or bronchospasm, if no treatments needed after 48 hours then discontinue using Per Protocol order mode. If indication present, adjust the RT bronchodilator orders based on the Bronchodilator Assessment Score as indicated below. Use Inhaler orders unless patient has one or more of the following: on home nebulizer, not able to hold breath for 10 seconds, is not alert and oriented, cannot activate and use MDI correctly, or respiratory rate 25 breaths per minute or more, then use the equivalent nebulizer order(s) with same Frequency and PRN reasons based on the score. If a patient is on this medication at home then do not decrease Frequency below that used at home.     0-3 - enter or revise RT bronchodilator order(s) to equivalent RT Bronchodilator order with Frequency of every 4 hours PRN for wheezing or increased work of

## 2022-07-26 NOTE — PROGRESS NOTES
Patient presents to the emergency room today with complaints of a stomachache, mid back pain and a headache. Patient states that his symptoms started approximately 5 days ago. Patient states that he also had a fever during that time, but did not check it because he does not have a thermometer. Patient states that he was taking Tylenol intermittently for the fever. The patient states that the fever would improve and then come right back. Patient has a productive cough with yellow sputum. Patient has been nauseated and vomiting for 2 days, and patient has not eaten or drank anything in 4 days. Of note, patient was evaluated at [facility] ER on 7/18/2022 and 7/19/2022. During those ED visits, patient's WBC count was 16.8 and 19.5. Patient's flu swab, rapid strep and COVID swab were both negative. Patient was discharged and advised to use Tylenol and Motrin for a viral upper respiratory illness. Patient has no significant past medical history and takes no medications at home. 7/26 -respiratory status mildly improved. We will continue broad-spectrum antibiotic coverage. GI evaluation completed and they plan for EGD/colonoscopy tomorrow. Review of Systems:     Constitutional:  negative for chills, fevers, sweats  Respiratory: Positive for cough, dyspnea on exertion, shortness of breath, wheezing  Cardiovascular:  negative for chest pain, chest pressure/discomfort, lower extremity edema, palpitations  Gastrointestinal:  negative for abdominal pain, constipation, diarrhea, nausea, vomiting  Neurological:  negative for dizziness, headache    Medications:      Allergies:  No Known Allergies    Current Meds:   Scheduled Meds:    nicotine  1 patch TransDERmal Daily    sodium chloride flush  5-40 mL IntraVENous 2 times per day    [Held by provider] enoxaparin  40 mg SubCUTAneous Daily    cefTRIAXone (ROCEPHIN) IV  1,000 mg IntraVENous Q24H    And    azithromycin  500 mg Oral Q24H    guaiFENesin  600 mg Oral BID    ipratropium-albuterol  1 ampule Inhalation BID     Continuous Infusions:    sodium chloride 75 mL/hr at 22 1057    sodium chloride       PRN Meds: potassium chloride **OR** potassium alternative oral replacement **OR** potassium chloride, sodium chloride flush, sodium chloride, ondansetron **OR** ondansetron, magnesium hydroxide, acetaminophen, albuterol, benzonatate, acetaminophen    Data:     Past Medical History:   has no past medical history on file. Social History:   reports that he has been smoking cigarettes. He has been smoking an average of 1 pack per day. He has never used smokeless tobacco. He reports that he does not currently use alcohol. He reports that he does not currently use drugs. Family History:   Family History   Problem Relation Age of Onset    No Known Problems Mother     No Known Problems Father     No Known Problems Brother     No Known Problems Brother        Vitals:  BP (!) 140/70   Pulse 85   Temp 98.3 °F (36.8 °C) (Oral)   Resp 16   Ht 5' 7\" (1.702 m)   Wt 125 lb 6 oz (56.9 kg)   SpO2 97%   BMI 19.64 kg/m²   Temp (24hrs), Av.6 °F (37.6 °C), Min:98.3 °F (36.8 °C), Max:101.8 °F (38.8 °C)    No results for input(s): POCGLU in the last 72 hours. I/O (24Hr):     Intake/Output Summary (Last 24 hours) at 2022 1314  Last data filed at 2022 1057  Gross per 24 hour   Intake 3336.17 ml   Output --   Net 3336.17 ml       Labs:  Hematology:  Recent Labs     22  1540 22  0403   WBC 8.9 7.9   RBC 4.83 4.51   HGB 12.7* 11.7*   HCT 38.4* 36.6*   MCV 79.5* 81.2*   MCH 26.3 25.9   MCHC 33.1 32.0   RDW 13.8 14.2   * 117*   MPV 11.1 12.1     Chemistry:  Recent Labs     22  1745 22  1935 22  2206 22  0403 22  0751 22  0350 22  0959 22  2203 22  0406 22  1021   NA  --   --  129* 129*   < > 129*   < > 130* 134* 132*   K  --   --  3.7 3.6*  --  3.8  --   --  3.7  --    CL  --   --  96* 98  --  97*  --   --  100  --    CO2  --   --  20 20  --  20  --   --  22  --    GLUCOSE  --   --  121* 110*  --  100*  --   --  100*  --    BUN  --   --  22* 21*  --  18  --   --  11  --    CREATININE  --   --  0.97 0.93  --  0.86  --   --  0.73  --    ANIONGAP  --   --  13 11  --  12  --   --  12  --    LABGLOM  --   --  >60 >60  --  >60  --   --  >60  --    GFRAA  --   --  >60 >60  --  >60  --   --  >60  --    CALCIUM  --   --  7.9* 7.8*  --  7.9*  --   --  7.9*  --    TROPHS 8 7 8  --   --   --   --   --   --   --     < > = values in this interval not displayed. Recent Labs     07/23/22  1540 07/24/22  0403 07/26/22  1136   PROT 6.8 6.2* 6.2*   LABALBU 2.7* 2.4* 2.7*   * 189* 158*   ALT 68* 62* 52*   ALKPHOS 64 61 91   BILITOT 0.96 0.89 0.92   BILIDIR 0.65* 0.62* 0.53*   LIPASE 16  --   --      ABG:No results found for: POCPH, PHART, PH, POCPCO2, ZCB2XRL, PCO2, POCPO2, PO2ART, PO2, POCHCO3, JEG7OXW, HCO3, NBEA, PBEA, BEART, BE, THGBART, THB, AQL5KXB, FUCI4LAU, I3DVANYS, O2SAT, FIO2  Lab Results   Component Value Date/Time    SPECIAL LAC 10ML 07/23/2022 04:40 PM     Lab Results   Component Value Date/Time    CULTURE PENDING 07/25/2022 10:40 AM       Radiology:  XR CHEST (2 VW)    Result Date: 7/24/2022  Right mid and lower lung infiltrate consistent with pneumonia. XR CHEST PORTABLE    Result Date: 7/23/2022  Extensive consolidation along the medial right lung, suspicious for multifocal pneumonia. Recommend follow-up to resolution.        Physical Examination:        General appearance:  alert, cooperative and no distress  Mental Status:  oriented to person, place and time and normal affect  Lungs: Increased effort with exertion, rhonchi noted with a productive cough and upper bronchus has coarse auscultation  Heart:  regular rate and rhythm, no murmur  Abdomen:  soft, nontender, nondistended, normal bowel sounds, no masses, hepatomegaly, splenomegaly  Extremities:  no edema, redness, tenderness in the calves  Skin:  no gross lesions, rashes, induration    Assessment:        Hospital Problems             Last Modified POA    * (Principal) Multifocal pneumonia 7/23/2022 Yes    Hyponatremia 7/23/2022 Yes    Elevated LFTs 7/23/2022 Yes    Hypokalemia 7/24/2022 Yes    Diarrhea-dark liquid with occult blood positive 7/25/2022 Yes    Smoker 7/25/2022 Yes       Plan:        Community-acquired multifocal pneumonia  Continue Rocephin and Zithromax  Pulmonary hygiene as ordered  Transition to oral Levaquin at time of discharge  Occult stool positive for blood  Discontinue NSAIDs  GI evaluation underway and plan for EGD/colonoscopy tomorrow      NORA Mott NP  7/26/2022  1:14 PM

## 2022-07-26 NOTE — CONSULTS
as needed for Pain   Yes Historical Provider, MD   amoxicillin-clavulanate (AUGMENTIN) 875-125 MG per tablet Take 1 tablet by mouth in the morning and 1 tablet before bedtime. For 10 days. Yes Historical Provider, MD   predniSONE (DELTASONE) 20 MG tablet Take 20 mg by mouth in the morning and 20 mg before bedtime. For 5 days. Yes Historical Provider, MD   ibuprofen (ADVIL;MOTRIN) 200 MG tablet Take 600 mg by mouth 2 times daily as needed for Pain   Yes Historical Provider, MD        Allergies:       Patient has no known allergies. Social History:     Tobacco:    reports that he has been smoking cigarettes. He has been smoking an average of 1 pack per day. He has never used smokeless tobacco.  Alcohol:      reports that he does not currently use alcohol. Drug Use:  reports that he does not currently use drugs.     Family History:     Family History   Problem Relation Age of Onset    No Known Problems Mother     No Known Problems Father     No Known Problems Brother     No Known Problems Brother        Review of Systems:     Positive and Negative as described in HPI    Constitutional:  negative for  fevers, chills, sweats, mild fatigue, and weight loss  HEENT:  negative for vision or hearing changes,   Respiratory: Positive shortness of breath, cough, or congestion  Cardiovascular:  negative for  chest pain, palpitations  Gastrointestinal: Positive nausea, vomiting, diarrhea, constipation, positive abdominal pain  Genitourinary:  negative for frequency, dysuria  Integument:  negative for rash, skin lesions  Musculoskeletal: Positive muscle aches or joint pain  Neurological:  negative for headaches, positive dizziness, lightheadedness, numbness, pain and tingling extrimities  Behavior/Psych:  negative for depression and positive anxiety    Code Status:  Full Code    Physical Exam:     Vitals:  BP (!) 140/70   Pulse 85   Temp 98.3 °F (36.8 °C) (Oral)   Resp 16   Ht 5' 7\" (1.702 m)   Wt 125 lb 6 oz (56.9 kg) SpO2 97%   BMI 19.64 kg/m²   Temp (24hrs), Av.6 °F (37.6 °C), Min:98.3 °F (36.8 °C), Max:101.8 °F (38.8 °C)      General appearance - alert, mildly malnourished appearing, and in no acute distress  Mental status - oriented to person, place, and time with anxious affect  Head - normocephalic and atraumatic  Eyes - pupils equal and reactive, extraocular eye movements intact, conjunctiva clear  Ears - hearing appears to be intact  Nose - no drainage noted  Mouth - mucous membranes moist  Neck - supple, no carotid bruits, thyroid not palpable  Chest -decreased air entry at the bases to auscultation, normal effort  Heart - normal rate, regular rhythm, no murmurs  Abdomen - soft, mild epigastric tenderness, nondistended, bowel sounds present all four quadrants, no masses, hepatomegaly or splenomegaly.  No hernias  Neurological - normal speech, no focal findings or movement disorder noted, cranial nerves II through XII grossly intact  Extremities - no pedal edema or calf pain with palpation  Skin - no gross lesions, rashes, or induration noted  Cranial Nerves : grossly intact  Lymph nodes: not done at this time    Data:   CBC:   Lab Results   Component Value Date/Time    WBC 7.9 2022 04:03 AM    RBC 4.51 2022 04:03 AM    HGB 11.7 2022 04:03 AM    HCT 36.6 2022 04:03 AM    MCV 81.2 2022 04:03 AM    MCH 25.9 2022 04:03 AM    MCHC 32.0 2022 04:03 AM    RDW 14.2 2022 04:03 AM     2022 04:03 AM    MPV 12.1 2022 04:03 AM     CBC with Differential:    Lab Results   Component Value Date/Time    WBC 7.9 2022 04:03 AM    RBC 4.51 2022 04:03 AM    HGB 11.7 2022 04:03 AM    HCT 36.6 2022 04:03 AM     2022 04:03 AM    MCV 81.2 2022 04:03 AM    MCH 25.9 2022 04:03 AM    MCHC 32.0 2022 04:03 AM    RDW 14.2 2022 04:03 AM    LYMPHOPCT 11 2022 04:03 AM    MONOPCT 9 2022 04:03 AM    BASOPCT 0 07/24/2022 04:03 AM    MONOSABS 0.67 07/24/2022 04:03 AM    LYMPHSABS 0.85 07/24/2022 04:03 AM    EOSABS <0.03 07/24/2022 04:03 AM    BASOSABS <0.03 07/24/2022 04:03 AM     Hemoglobin/Hematocrit:    Lab Results   Component Value Date/Time    HGB 11.7 07/24/2022 04:03 AM    HCT 36.6 07/24/2022 04:03 AM     CMP:    Lab Results   Component Value Date/Time     07/26/2022 10:21 AM    K 3.7 07/26/2022 04:06 AM     07/26/2022 04:06 AM    CO2 22 07/26/2022 04:06 AM    BUN 11 07/26/2022 04:06 AM    CREATININE 0.73 07/26/2022 04:06 AM    GFRAA >60 07/26/2022 04:06 AM    LABGLOM >60 07/26/2022 04:06 AM    GLUCOSE 100 07/26/2022 04:06 AM    PROT 6.2 07/26/2022 11:36 AM    LABALBU 2.7 07/26/2022 11:36 AM    CALCIUM 7.9 07/26/2022 04:06 AM    BILITOT 0.92 07/26/2022 11:36 AM    ALKPHOS 91 07/26/2022 11:36 AM     07/26/2022 11:36 AM    ALT 52 07/26/2022 11:36 AM     BMP:    Lab Results   Component Value Date/Time     07/26/2022 10:21 AM    K 3.7 07/26/2022 04:06 AM     07/26/2022 04:06 AM    CO2 22 07/26/2022 04:06 AM    BUN 11 07/26/2022 04:06 AM    LABALBU 2.7 07/26/2022 11:36 AM    CREATININE 0.73 07/26/2022 04:06 AM    CALCIUM 7.9 07/26/2022 04:06 AM    GFRAA >60 07/26/2022 04:06 AM    LABGLOM >60 07/26/2022 04:06 AM    GLUCOSE 100 07/26/2022 04:06 AM     PT/INR:  No results found for: PROTIME, INR  PTT:  No results found for: APTT, PTT[APTT}    Assesment:     Primary Problem  Multifocal pneumonia    Active Hospital Problems    Diagnosis Date Noted    Diarrhea-dark liquid with occult blood positive [R19.7] 07/25/2022     Priority: Medium    Smoker [F17.200] 07/25/2022     Priority: Medium    Hypokalemia [E87.6] 07/24/2022     Priority: Medium    Multifocal pneumonia [J18.9] 07/23/2022     Priority: Medium    Hyponatremia [E87.1] 07/23/2022     Priority: Medium    Elevated LFTs [R79.89] 07/23/2022     Priority: Medium     Coffee-ground emesis  Melanotic stools  NSAID's use  Nausea vomiting  Mild

## 2022-07-26 NOTE — PROGRESS NOTES
Patient has been running low grade fever. Tylenol was administered to help with fever. Patient educated again on the importance of using incentive spirometer and getting up and ambulating during the day to help with his breathing. Patient currently not very compliant with the use of incentive spirometer regularly or getting up and ambulating. States \"I am not out of shape so I don't need all this. \" Bryan Zaragoza had to explain on how it has nothing to do with his shape but it is to help him with his breathing and prevent worsening of his pneumonia. Patient expressed understanding of the education provided.

## 2022-07-27 ENCOUNTER — ANESTHESIA (OUTPATIENT)
Dept: OPERATING ROOM | Age: 32
DRG: 139 | End: 2022-07-27
Payer: MEDICAID

## 2022-07-27 VITALS
RESPIRATION RATE: 20 BRPM | OXYGEN SATURATION: 95 % | WEIGHT: 127.13 LBS | DIASTOLIC BLOOD PRESSURE: 60 MMHG | HEIGHT: 67 IN | BODY MASS INDEX: 19.96 KG/M2 | TEMPERATURE: 99 F | HEART RATE: 76 BPM | SYSTOLIC BLOOD PRESSURE: 123 MMHG

## 2022-07-27 LAB
ABSOLUTE EOS #: 0 K/UL (ref 0–0.4)
ABSOLUTE IMMATURE GRANULOCYTE: 0.1 K/UL (ref 0–0.3)
ABSOLUTE LYMPH #: 1.2 K/UL (ref 1–4.8)
ABSOLUTE MONO #: 0.9 K/UL (ref 0.2–0.8)
ANION GAP SERPL CALCULATED.3IONS-SCNC: 10 MMOL/L (ref 9–17)
ATYPICAL LYMPHOCYTE ABSOLUTE COUNT: 0.1 K/UL
ATYPICAL LYMPHOCYTES: 1 %
BASOPHILS # BLD: 0 %
BASOPHILS ABSOLUTE: 0 K/UL (ref 0–0.2)
BUN BLDV-MCNC: 10 MG/DL (ref 6–20)
BUN/CREAT BLD: 16 (ref 9–20)
CALCIUM SERPL-MCNC: 7.9 MG/DL (ref 8.6–10.4)
CHLORIDE BLD-SCNC: 103 MMOL/L (ref 98–107)
CO2: 24 MMOL/L (ref 20–31)
CREAT SERPL-MCNC: 0.63 MG/DL (ref 0.7–1.2)
EOSINOPHILS RELATIVE PERCENT: 0 % (ref 1–4)
GFR AFRICAN AMERICAN: >60 ML/MIN
GFR NON-AFRICAN AMERICAN: >60 ML/MIN
GFR SERPL CREATININE-BSD FRML MDRD: ABNORMAL ML/MIN/{1.73_M2}
GLUCOSE BLD-MCNC: 99 MG/DL (ref 70–99)
HCT VFR BLD CALC: 31.9 % (ref 40.7–50.3)
HEMOGLOBIN: 10.3 G/DL (ref 13–17)
IMMATURE GRANULOCYTES: 1 %
LYMPHOCYTES # BLD: 12 % (ref 24–44)
MCH RBC QN AUTO: 26.3 PG (ref 25.2–33.5)
MCHC RBC AUTO-ENTMCNC: 32.3 G/DL (ref 28.4–34.8)
MCV RBC AUTO: 81.4 FL (ref 82.6–102.9)
MONOCYTES # BLD: 9 % (ref 1–7)
NRBC AUTOMATED: 0 PER 100 WBC
PDW BLD-RTO: 14.5 % (ref 11.8–14.4)
PLATELET # BLD: 216 K/UL (ref 138–453)
PMV BLD AUTO: 10.9 FL (ref 8.1–13.5)
POTASSIUM SERPL-SCNC: 3.4 MMOL/L (ref 3.7–5.3)
RBC # BLD: 3.92 M/UL (ref 4.21–5.77)
SEG NEUTROPHILS: 77 % (ref 36–66)
SEGMENTED NEUTROPHILS ABSOLUTE COUNT: 7.7 K/UL (ref 1.8–7.7)
SODIUM BLD-SCNC: 137 MMOL/L (ref 135–144)
WBC # BLD: 10 K/UL (ref 3.5–11.3)

## 2022-07-27 PROCEDURE — 3609012400 HC EGD TRANSORAL BIOPSY SINGLE/MULTIPLE: Performed by: INTERNAL MEDICINE

## 2022-07-27 PROCEDURE — 80048 BASIC METABOLIC PNL TOTAL CA: CPT

## 2022-07-27 PROCEDURE — 2500000003 HC RX 250 WO HCPCS: Performed by: NURSE ANESTHETIST, CERTIFIED REGISTERED

## 2022-07-27 PROCEDURE — 6370000000 HC RX 637 (ALT 250 FOR IP): Performed by: NURSE PRACTITIONER

## 2022-07-27 PROCEDURE — 94761 N-INVAS EAR/PLS OXIMETRY MLT: CPT

## 2022-07-27 PROCEDURE — 7100000001 HC PACU RECOVERY - ADDTL 15 MIN: Performed by: INTERNAL MEDICINE

## 2022-07-27 PROCEDURE — 43239 EGD BIOPSY SINGLE/MULTIPLE: CPT | Performed by: INTERNAL MEDICINE

## 2022-07-27 PROCEDURE — 99239 HOSP IP/OBS DSCHRG MGMT >30: CPT | Performed by: NURSE PRACTITIONER

## 2022-07-27 PROCEDURE — 0DB68ZX EXCISION OF STOMACH, VIA NATURAL OR ARTIFICIAL OPENING ENDOSCOPIC, DIAGNOSTIC: ICD-10-PCS | Performed by: INTERNAL MEDICINE

## 2022-07-27 PROCEDURE — 6360000002 HC RX W HCPCS: Performed by: NURSE ANESTHETIST, CERTIFIED REGISTERED

## 2022-07-27 PROCEDURE — 6370000000 HC RX 637 (ALT 250 FOR IP): Performed by: INTERNAL MEDICINE

## 2022-07-27 PROCEDURE — 88312 SPECIAL STAINS GROUP 1: CPT

## 2022-07-27 PROCEDURE — 85025 COMPLETE CBC W/AUTO DIFF WBC: CPT

## 2022-07-27 PROCEDURE — 2580000003 HC RX 258: Performed by: STUDENT IN AN ORGANIZED HEALTH CARE EDUCATION/TRAINING PROGRAM

## 2022-07-27 PROCEDURE — 36415 COLL VENOUS BLD VENIPUNCTURE: CPT

## 2022-07-27 PROCEDURE — 3700000000 HC ANESTHESIA ATTENDED CARE: Performed by: INTERNAL MEDICINE

## 2022-07-27 PROCEDURE — 0DB98ZX EXCISION OF DUODENUM, VIA NATURAL OR ARTIFICIAL OPENING ENDOSCOPIC, DIAGNOSTIC: ICD-10-PCS | Performed by: INTERNAL MEDICINE

## 2022-07-27 PROCEDURE — 7100000000 HC PACU RECOVERY - FIRST 15 MIN: Performed by: INTERNAL MEDICINE

## 2022-07-27 PROCEDURE — 3700000001 HC ADD 15 MINUTES (ANESTHESIA): Performed by: INTERNAL MEDICINE

## 2022-07-27 PROCEDURE — 0DB58ZX EXCISION OF ESOPHAGUS, VIA NATURAL OR ARTIFICIAL OPENING ENDOSCOPIC, DIAGNOSTIC: ICD-10-PCS | Performed by: INTERNAL MEDICINE

## 2022-07-27 PROCEDURE — APPSS45 APP SPLIT SHARED TIME 31-45 MINUTES: Performed by: NURSE PRACTITIONER

## 2022-07-27 PROCEDURE — 88305 TISSUE EXAM BY PATHOLOGIST: CPT

## 2022-07-27 PROCEDURE — 2709999900 HC NON-CHARGEABLE SUPPLY: Performed by: INTERNAL MEDICINE

## 2022-07-27 PROCEDURE — 94640 AIRWAY INHALATION TREATMENT: CPT

## 2022-07-27 RX ORDER — PROPOFOL 10 MG/ML
INJECTION, EMULSION INTRAVENOUS PRN
Status: DISCONTINUED | OUTPATIENT
Start: 2022-07-27 | End: 2022-07-27 | Stop reason: SDUPTHER

## 2022-07-27 RX ORDER — GUAIFENESIN 600 MG/1
600 TABLET, EXTENDED RELEASE ORAL 2 TIMES DAILY
Qty: 20 TABLET | Refills: 0 | Status: SHIPPED | OUTPATIENT
Start: 2022-07-27 | End: 2022-08-06

## 2022-07-27 RX ORDER — SODIUM CHLORIDE 9 MG/ML
INJECTION, SOLUTION INTRAVENOUS PRN
Status: DISCONTINUED | OUTPATIENT
Start: 2022-07-27 | End: 2022-07-27 | Stop reason: HOSPADM

## 2022-07-27 RX ORDER — LIDOCAINE HYDROCHLORIDE 20 MG/ML
INJECTION, SOLUTION INFILTRATION; PERINEURAL PRN
Status: DISCONTINUED | OUTPATIENT
Start: 2022-07-27 | End: 2022-07-27 | Stop reason: SDUPTHER

## 2022-07-27 RX ORDER — SODIUM CHLORIDE, SODIUM LACTATE, POTASSIUM CHLORIDE, CALCIUM CHLORIDE 600; 310; 30; 20 MG/100ML; MG/100ML; MG/100ML; MG/100ML
INJECTION, SOLUTION INTRAVENOUS CONTINUOUS
Status: DISCONTINUED | OUTPATIENT
Start: 2022-07-27 | End: 2022-07-27

## 2022-07-27 RX ORDER — LEVOFLOXACIN 750 MG/1
750 TABLET ORAL DAILY
Qty: 10 TABLET | Refills: 0 | Status: SHIPPED | OUTPATIENT
Start: 2022-07-27 | End: 2022-08-06

## 2022-07-27 RX ORDER — BENZONATATE 100 MG/1
100 CAPSULE ORAL 3 TIMES DAILY PRN
Qty: 14 CAPSULE | Refills: 0 | Status: SHIPPED | OUTPATIENT
Start: 2022-07-27 | End: 2022-08-03

## 2022-07-27 RX ORDER — SODIUM CHLORIDE 9 MG/ML
INJECTION, SOLUTION INTRAVENOUS CONTINUOUS
Status: DISCONTINUED | OUTPATIENT
Start: 2022-07-27 | End: 2022-07-27

## 2022-07-27 RX ORDER — SODIUM CHLORIDE 0.9 % (FLUSH) 0.9 %
5-40 SYRINGE (ML) INJECTION EVERY 12 HOURS SCHEDULED
Status: DISCONTINUED | OUTPATIENT
Start: 2022-07-27 | End: 2022-07-27 | Stop reason: HOSPADM

## 2022-07-27 RX ORDER — SODIUM CHLORIDE 0.9 % (FLUSH) 0.9 %
5-40 SYRINGE (ML) INJECTION PRN
Status: DISCONTINUED | OUTPATIENT
Start: 2022-07-27 | End: 2022-07-27 | Stop reason: HOSPADM

## 2022-07-27 RX ORDER — LIDOCAINE HYDROCHLORIDE 10 MG/ML
1 INJECTION, SOLUTION EPIDURAL; INFILTRATION; INTRACAUDAL; PERINEURAL
Status: DISCONTINUED | OUTPATIENT
Start: 2022-07-27 | End: 2022-07-27 | Stop reason: HOSPADM

## 2022-07-27 RX ORDER — ALBUTEROL SULFATE 90 UG/1
2 AEROSOL, METERED RESPIRATORY (INHALATION) EVERY 6 HOURS PRN
Qty: 18 G | Refills: 3 | Status: SHIPPED | OUTPATIENT
Start: 2022-07-27

## 2022-07-27 RX ADMIN — SODIUM CHLORIDE: 9 INJECTION, SOLUTION INTRAVENOUS at 01:11

## 2022-07-27 RX ADMIN — PROPOFOL 50 MG: 10 INJECTION, EMULSION INTRAVENOUS at 10:34

## 2022-07-27 RX ADMIN — PROPOFOL 100 MG: 10 INJECTION, EMULSION INTRAVENOUS at 10:29

## 2022-07-27 RX ADMIN — ACETAMINOPHEN 650 MG: 325 TABLET ORAL at 01:10

## 2022-07-27 RX ADMIN — SODIUM CHLORIDE: 9 INJECTION, SOLUTION INTRAVENOUS at 06:44

## 2022-07-27 RX ADMIN — PROPOFOL 50 MG: 10 INJECTION, EMULSION INTRAVENOUS at 10:31

## 2022-07-27 RX ADMIN — LIDOCAINE HYDROCHLORIDE 60 MG: 20 INJECTION, SOLUTION INFILTRATION; PERINEURAL at 10:29

## 2022-07-27 RX ADMIN — IPRATROPIUM BROMIDE AND ALBUTEROL SULFATE 1 AMPULE: 2.5; .5 SOLUTION RESPIRATORY (INHALATION) at 09:38

## 2022-07-27 RX ADMIN — POTASSIUM CHLORIDE 40 MEQ: 1500 TABLET, EXTENDED RELEASE ORAL at 12:55

## 2022-07-27 RX ADMIN — PROPOFOL 30 MG: 10 INJECTION, EMULSION INTRAVENOUS at 10:36

## 2022-07-27 ASSESSMENT — ENCOUNTER SYMPTOMS: SHORTNESS OF BREATH: 0

## 2022-07-27 NOTE — DISCHARGE SUMMARY
[facility]  Office: [address], DO, David Simper, DO, Brittanie Lopeske, DO, Aftab Mirandan Blood, DO, Kristen Duran MD, Fantasma Treviño MD, Gunner Stovall MD, Galileo Lr MD,  Colette Fink MD, Xiomara Epps MD, Heike Moraes, DO, Sherly Kendall MD,  Doc Snow MD, Luis Brownlee MD, Varsha Levine, DO, Sharon Dexter MD, Loretta Keller MD, Loree Hare MD, Manuel Hernandez, DO, Cm Mejia MD, Yoni Holland MD, Birtha Members, CNP,  Thai Reyes, CNP, Jian Steen, CNP, Efra Bassett, CNP, [employer] [place] PAV[employer]key[employer], Dandy Bowles, [facility], Polina Silveira, CNP, Ashley Meyers, CNP, Hussain Sarmiento, CNP, Marianna Lagos, CNP, Homero Young, CNP, Makayla Cassidy, CNS, Andrés Matthew, [facility], Garrett Doyle, CNP, Peyton Sanchez, CNP, Tank Rooney, [facility]    Discharge Summary     Patient ID: Álvaro Zaidi  :  1990   MRN: [id number]     ACCOUNT:  [de-identified]   Patient's PCP: No primary care provider on file. Admit Date: 2022   Discharge Date: 2022     Length of Stay: 2  Code Status:  Full Code  Admitting Physician: Isreal Ordaz MD  Discharge Physician: NORA Vines - NP     Active Discharge Diagnoses:     Hospital Problem Lists:  Principal Problem:    Multifocal pneumonia  Active Problems:    Hyponatremia    Elevated LFTs    Hypokalemia    Diarrhea-dark liquid with occult blood positive    Smoker    Pneumonia of both lungs due to infectious organism  Resolved Problems:    * No resolved hospital problems.  *      Admission Condition:  fair     Discharged Condition: good    Hospital Stay:     Hospital Course:  Álvaro Zaidi is a 33 y.o. male who was admitted for the management of  Multifocal pneumonia , presented to ER with Cough, Shortness of Breath, Headache, and Fever     - Patient presents to the emergency room today with complaints of a stomachache, mid back pain and a headache. Patient states that his symptoms started approximately 5 days ago. Patient states that he also had a fever during that time, but did not check it because he does not have a thermometer. Patient states that he was taking Tylenol intermittently for the fever. The patient states that the fever would improve and then come right back. Patient has a productive cough with yellow sputum. Patient has been nauseated and vomiting for 2 days, and patient has not eaten or drank anything in 4 days. Of note, patient was evaluated at [facility] ER on 7/18/2022 and 7/19/2022. During those ED visits, patient's WBC count was 16.8 and 19.5. Patient's flu swab, rapid strep and COVID swab were both negative. Patient was discharged and advised to use Tylenol and Motrin for a viral upper respiratory illness. Patient has no significant past medical history and takes no medications at home. 7/26 -respiratory status mildly improved. We will continue broad-spectrum antibiotic coverage. GI evaluation completed and they plan for EGD/colonoscopy tomorrow. 7/27 - Condition is again improved overnight. Patient does not require supplemental oxygen. He reports far less shortness of breath and cough today. Blood cultures are negative x3 days. GI evaluation is underway and an EGD was completed without overt signs of hemorrhage. GI advised discontinuation of NSAIDs and outpatient follow-up. Internal medicine is comfortable with hospital discharge. Options for care discussed with the patient and he would like to proceed with an outpatient treatment modality.       Significant therapeutic interventions: As above    Significant Diagnostic Studies:   Labs / Micro:  CBC:   Lab Results   Component Value Date/Time    WBC 10.0 07/27/2022 08:57 AM    RBC 3.92 07/27/2022 08:57 AM    HGB 10.3 07/27/2022 08:57 AM    HCT 31.9 07/27/2022 08:57 AM    MCV 81.4 07/27/2022 08:57 AM    MCH 26.3 07/27/2022 08:57 AM MCHC 32.3 07/27/2022 08:57 AM    RDW 14.5 07/27/2022 08:57 AM     07/27/2022 08:57 AM     BMP:    Lab Results   Component Value Date/Time    GLUCOSE 99 07/27/2022 05:40 AM     07/27/2022 05:40 AM    K 3.4 07/27/2022 05:40 AM     07/27/2022 05:40 AM    CO2 24 07/27/2022 05:40 AM    ANIONGAP 10 07/27/2022 05:40 AM    BUN 10 07/27/2022 05:40 AM    CREATININE 0.63 07/27/2022 05:40 AM    BUNCRER 16 07/27/2022 05:40 AM    CALCIUM 7.9 07/27/2022 05:40 AM    LABGLOM >60 07/27/2022 05:40 AM    GFRAA >60 07/27/2022 05:40 AM    GFR      07/27/2022 05:40 AM        Radiology:  XR CHEST (2 VW)    Result Date: 7/24/2022  Right mid and lower lung infiltrate consistent with pneumonia. US LIVER    Result Date: 7/26/2022  1. So-called \"starry wendy\" appearance of the liver in association with mild hepatomegaly, findings that be seen with acute hepatitis. However, the findings are indeterminate and may be normal for the patient. 2. A questionable exophytic lesion measuring up to 4.3 cm may arise from the upper pole of the right kidney. Alternatively, the appearance could be related to variant lobulation. Recommend further evaluation with renal abdomen (preferred) or CT. XR CHEST PORTABLE    Result Date: 7/23/2022  Extensive consolidation along the medial right lung, suspicious for multifocal pneumonia. Recommend follow-up to resolution. Consultations:    Consults:     Final Specialist Recommendations/Findings:   IP CONSULT TO HOSPITALIST  IP CONSULT TO GI      The patient was seen and examined on day of discharge and this discharge summary is in conjunction with any daily progress note from day of discharge.     Discharge plan:     Disposition: Home    Physician Follow Up:     MD Cricket Gardiner [address]  [address] [place] [address]  [phone]    Schedule an appointment as soon as possible for a visit in 3 week(s)       Requiring Further Evaluation/Follow Up POST HOSPITALIZATION/Incidental Findings: Follow-up biopsies with gastroenterology    Diet: regular diet and encourage fluids    Activity: As tolerated    Instructions to Patient: Take the oral antibiotics all the way through to completion. Make and keep a follow-up appoint with your primary care provider to ensure resolution of your pneumonia. Make and keep a follow-up appointment with your gastroenterologist to get your biopsy results. Please do not take any more Motrin as this will cause further damage to your stomach. Discharge Medications:      Medication List        START taking these medications      albuterol sulfate  (90 Base) MCG/ACT inhaler  Commonly known as: Proventil HFA  Inhale 2 puffs into the lungs every 6 hours as needed for Wheezing     benzonatate 100 MG capsule  Commonly known as: TESSALON  Take 1 capsule by mouth 3 times daily as needed for Cough     guaiFENesin 600 MG extended release tablet  Commonly known as: MUCINEX  Take 1 tablet by mouth in the morning and 1 tablet before bedtime. Do all this for 10 days. levoFLOXacin 750 MG tablet  Commonly known as: Levaquin  Take 1 tablet by mouth in the morning for 10 days. CONTINUE taking these medications      acetaminophen 325 MG tablet  Commonly known as: TYLENOL     predniSONE 20 MG tablet  Commonly known as: Luzma Slot taking these medications      amoxicillin-clavulanate 875-125 MG per tablet  Commonly known as: AUGMENTIN     ibuprofen 200 MG tablet  Commonly known as: ADVIL;MOTRIN               Where to Get Your Medications        These medications were sent to [facility] [address]  [address] [address]      Phone: [phone]   albuterol sulfate  (90 Base) MCG/ACT inhaler  benzonatate 100 MG capsule  guaiFENesin 600 MG extended release tablet  levoFLOXacin 750 MG tablet         No discharge procedures on file.     Time Spent on discharge is  37 mins in patient examination, evaluation, counseling as well as medication reconciliation, prescriptions for required medications, discharge plan and follow up. Electronically signed by   NORA Kennedy NP  7/27/2022  3:27 PM      Thank you Dr. Vandana Garcia primary care provider on file. for the opportunity to be involved in this patient's care.

## 2022-07-27 NOTE — RT PROTOCOL NOTE
bronchodilator orders with one order with TID Frequency and one order with Frequency of every 4 hours PRN wheezing or increased work of breathing using Per Protocol order mode. 11-13 - enter or revise RT Bronchodilator order(s) to one equivalent RT bronchodilator order with QID Frequency and an Albuterol order with Frequency of every 4 hours PRN wheezing or increased work of breathing using Per Protocol order mode. Greater than 13 - enter or revise RT Bronchodilator order(s) to one equivalent RT bronchodilator order with every 4 hours Frequency and an Albuterol order with Frequency of every 2 hours PRN wheezing or increased work of breathing using Per Protocol order mode. RT to enter RT Home Evaluation for COPD & MDI Assessment order using Per Protocol order mode.     Electronically signed by Ruma Osorio RCP on 7/26/2022 at 8:39 PM

## 2022-07-27 NOTE — PROGRESS NOTES
Physician Progress Note      PATIENT:               Omid Gonzalez  CSN #:                  [id number]  :                       1990  ADMIT DATE:       2022 3:04 PM  [address] [place] DATE:  RESPONDING  PROVIDER #:        Dewey Hurd NP          QUERY TEXT:    Pt admitted with PNA. Pt noted to have Elevated temp., ST . If possible,   please document in the progress notes and discharge summary if you are   evaluating and /or treating any of the following: The medical record reflects the following:  Risk Factors: PNA, fever chills body aches  Clinical Indicators:  CXR multifocal PNA , WBC 8.9, T max 39.4 ST  ,   initial hypotension. currently normotensive  Treatment: 1L IVF, IVF @ 75, Zithromax, Rocephin  Options provided:  -- Sepsis, present on admission  -- Sepsis was ruled out  -- Other - I will add my own diagnosis  -- Disagree - Not applicable / Not valid  -- Disagree - Clinically unable to determine / Unknown  -- Refer to Clinical Documentation Reviewer    PROVIDER RESPONSE TEXT:    After further study, sepsis was ruled out for this patient.     Query created by: Claudean Ralph on 2022 10:27 AM      Electronically signed by:  Dewey Hurd NP 2022 9:30 AM

## 2022-07-27 NOTE — PLAN OF CARE
Care Plan Note  Pt resting in bed, denies any complaints or needs at this time.    Problem: Discharge Planning  Goal: Discharge to home or other facility with appropriate resources  7/26/2022 2156 by Josefina Wilde RN  Flowsheets (Taken 7/26/2022 0830 by Dami Abraham RN)  Discharge to home or other facility with appropriate resources:   Identify barriers to discharge with patient and caregiver   Arrange for needed discharge resources and transportation as appropriate   Identify discharge learning needs (meds, wound care, etc)   Refer to discharge planning if patient needs post-hospital services based on physician order or complex needs related to functional status, cognitive ability or social support system  7/26/2022 1013 by Dami Abraham RN  Outcome: Progressing Towards Goal  Flowsheets (Taken 7/26/2022 0830)  Discharge to home or other facility with appropriate resources:   Identify barriers to discharge with patient and caregiver   Arrange for needed discharge resources and transportation as appropriate   Identify discharge learning needs (meds, wound care, etc)   Refer to discharge planning if patient needs post-hospital services based on physician order or complex needs related to functional status, cognitive ability or social support system     Problem: Pain  Goal: Verbalizes/displays adequate comfort level or baseline comfort level  7/26/2022 2156 by Josefina Wilde RN  Flowsheets (Taken 7/26/2022 2156)  Verbalizes/displays adequate comfort level or baseline comfort level:   Encourage patient to monitor pain and request assistance   Assess pain using appropriate pain scale   Administer analgesics based on type and severity of pain and evaluate response   Implement non-pharmacological measures as appropriate and evaluate response   Consider cultural and social influences on pain and pain management   Notify Licensed Independent Practitioner if interventions unsuccessful or patient reports new pain  7/26/2022 1013 by Keli Kearney RN  Outcome: Progressing Towards Goal     Problem: ABCDS Injury Assessment  Goal: Absence of physical injury  7/26/2022 2156 by Mandi Vera RN  Flowsheets (Taken 7/26/2022 0900 by Keli Kearney RN)  Absence of Physical Injury: Implement safety measures based on patient assessment  7/26/2022 1013 by Keli Kearney RN  Outcome: Progressing Towards Goal  Flowsheets (Taken 7/26/2022 0900)  Absence of Physical Injury: Implement safety measures based on patient assessment

## 2022-07-27 NOTE — OP NOTE
PROCEDURE NOTE    DATE OF PROCEDURE: 7/27/2022     SURGEON: Jose Guadalupe Hannah MD    ASSISTANT: None    PREOPERATIVE DIAGNOSIS: GI BLEEDING  ANEMIA  NSAIDS USE    POSTOPERATIVE DIAGNOSIS: As described below    OPERATION: Upper GI endoscopy with Biopsy    ANESTHESIA: MAC PER ANESTHESIA     ESTIMATED BLOOD LOSS: Less than 50 ml    COMPLICATIONS: None. SPECIMENS:  Was Obtained:     HISTORY: The patient is a 33y.o. year old male with history of above preop diagnosis. I recommended esophagogastroduodenoscopy with possible biopsy and I explained the risk, benefits, expected outcome, and alternatives to the procedure. Risks included but are not limited to bleeding, infection, respiratory distress, hypotension, and perforation of the esophagus, stomach, or duodenum. Patient understands and is in agreement. PROCEDURE: The patient was given IV conscious sedation. The patient's SPO2 remained above 90% throughout the procedure. The gastroscope was inserted orally and advanced under direct vision through the esophagus, through the stomach, through the pylorus, and into the descending duodenum. Findings:    Retropharyngeal area was grossly normal appearing    Esophagus: abnormal: SMALL ULCER AT GEJ WAS BIOPSIED  SMALL ONE CM HIATAL HERNIA    Stomach:    Fundus: normal    Body: normal    Antrum: abnormal: MOD LINEAR GASTRITIS WAS BIOPSIED    Duodenum:     Descending: normal    Bulb: abnormal: ONE ONE CM AND ANOTHER 5 MM CLEAN BASE ULCERS NOTED IN THE DUODENAL BULB WITH NO STIGMATA OF BLEEDING      The scope was removed and the patient tolerated the procedure well.      Recommendations/Plan:   F/U Biopsies  PPI BID  SOFT DIET  F/U In Office in 3-4 weeks  Discussed with the family  Post sedation patient was stable with stable vital signs and stable O2 saturations    Electronically signed by Jose Guadalupe Hannah MD  on 7/27/2022 at 10:38 AM

## 2022-07-27 NOTE — ANESTHESIA PRE PROCEDURE
Department of Anesthesiology  Preprocedure Note       Name:  Shaan Shelton   Age:  33 y.o.  :  1990                                          MRN:  [id number]         Date:  2022      Surgeon: Roma Gardner):  Alyssa Gamez MD    Procedure: Procedure(s):  EGD ESOPHAGOGASTRODUODENOSCOPY    Medications prior to admission:   Prior to Admission medications    Medication Sig Start Date End Date Taking? Authorizing Provider   acetaminophen (TYLENOL) 325 MG tablet Take 650 mg by mouth every 4 hours as needed for Pain   Yes Historical Provider, MD   amoxicillin-clavulanate (AUGMENTIN) 875-125 MG per tablet Take 1 tablet by mouth in the morning and 1 tablet before bedtime. For 10 days. Yes Historical Provider, MD   predniSONE (DELTASONE) 20 MG tablet Take 20 mg by mouth in the morning and 20 mg before bedtime. For 5 days.    Yes Historical Provider, MD   ibuprofen (ADVIL;MOTRIN) 200 MG tablet Take 600 mg by mouth 2 times daily as needed for Pain   Yes Historical Provider, MD       Current medications:    Current Facility-Administered Medications   Medication Dose Route Frequency Provider Last Rate Last Admin    lidocaine PF 1 % injection 1 mL  1 mL IntraDERmal Once PRN Thai Bentley MD        0.9 % sodium chloride infusion   IntraVENous Continuous Thai Bentley  mL/hr at 22 0644 New Bag at 22 0644    lactated ringers infusion   IntraVENous Continuous Thai Bentley MD        sodium chloride flush 0.9 % injection 5-40 mL  5-40 mL IntraVENous 2 times per day Thai Bentley MD        sodium chloride flush 0.9 % injection 5-40 mL  5-40 mL IntraVENous PRN Thai Bentley MD        0.9 % sodium chloride infusion   IntraVENous PRN Thai Bentley MD        nicotine (NICODERM CQ) 21 MG/24HR 1 patch  1 patch TransDERmal Daily Ilana Whitt MD   1 patch at 22 7202    potassium chloride (KLOR-CON M) extended release tablet 40 mEq  40 mEq Oral PRN Ilana Whitt MD        Or    potassium bicarb-citric acid (EFFER-K) effervescent tablet 40 mEq  40 mEq Oral PRN Agustina Morales MD        Or    potassium chloride 10 mEq/100 mL IVPB (Peripheral Line)  10 mEq IntraVENous PRN Agustina Morales MD        0.9 % sodium chloride infusion   IntraVENous Continuous Allyson Blaelda, APRN - CNP   Stopped at 07/27/22 0112    sodium chloride flush 0.9 % injection 5-40 mL  5-40 mL IntraVENous 2 times per day Allyson Blaze, APRN - CNP   10 mL at 07/26/22 9320    sodium chloride flush 0.9 % injection 10 mL  10 mL IntraVENous PRN Ethel Netta Semaj, APRN - CNP        0.9 % sodium chloride infusion   IntraVENous PRN Allyson Blaze, APRN - CNP        [Held by provider] enoxaparin (LOVENOX) injection 40 mg  40 mg SubCUTAneous Daily Allyson Blaze, APRN - CNP   40 mg at 07/23/22 2054    ondansetron (ZOFRAN-ODT) disintegrating tablet 4 mg  4 mg Oral Q8H PRN Allyson Blaze, APRN - CNP        Or    ondansetron (ZOFRAN) injection 4 mg  4 mg IntraVENous Q6H PRN Allyson Blaze, APRN - CNP        magnesium hydroxide (MILK OF MAGNESIA) 400 MG/5ML suspension 30 mL  30 mL Oral Daily PRN Allyson Blaze, APRN - CNP        acetaminophen (TYLENOL) suppository 650 mg  650 mg Rectal Q6H PRN Allyson Blaze, APRN - CNP        albuterol (PROVENTIL) nebulizer solution 2.5 mg  2.5 mg Nebulization Q2H PRN Allyson Blaze, APRN - CNP   2.5 mg at 07/25/22 1700    cefTRIAXone (ROCEPHIN) 1000 mg IVPB in 50 mL D5W minibag  1,000 mg IntraVENous Q24H Allyson Blaze, APRN - CNP   Stopped at 07/26/22 1643    guaiFENesin (MUCINEX) extended release tablet 600 mg  600 mg Oral BID Allyson Blaze, APRN - CNP   600 mg at 07/26/22 2049    benzonatate (TESSALON) capsule 100 mg  100 mg Oral TID PRN Allyson Blaze, APRN - CNP   100 mg at 07/26/22 0826    ipratropium-albuterol (DUONEB) nebulizer solution 1 ampule  1 ampule Inhalation BID NORA Hirsch - CNP   1 ampule at 07/27/22 6955    acetaminophen (TYLENOL) tablet 650 mg  650 mg Oral Q4H PRN NORA Luna CNP   650 mg at 07/27/22 0110       Allergies:  No Known Allergies    Problem List:    Patient Active Problem List   Diagnosis Code    Multifocal pneumonia J18.9    Hyponatremia E87.1    Elevated LFTs R79.89    Hypokalemia E87.6    Diarrhea-dark liquid with occult blood positive R19.7    Smoker F17.200    Pneumonia of both lungs due to infectious organism J18.9       Past Medical History:  History reviewed. No pertinent past medical history. Past Surgical History:  History reviewed. No pertinent surgical history. Social History:    Social History     Tobacco Use    Smoking status: Every Day     Packs/day: 1.00     Types: Cigarettes    Smokeless tobacco: Never   Substance Use Topics    Alcohol use: Not Currently                                Ready to quit: Not Answered  Counseling given: Not Answered      Vital Signs (Current):   Vitals:    07/27/22 0200 07/27/22 0340 07/27/22 0820 07/27/22 0939   BP:   128/65    Pulse:   60    Resp:   18    Temp: 99 °F (37.2 °C)  98.4 °F (36.9 °C)    TempSrc:   Oral    SpO2:   97% 97%   Weight:  127 lb 2 oz (57.7 kg)     Height:                                                  BP Readings from Last 3 Encounters:   07/27/22 128/65       NPO Status: Time of last liquid consumption: 2359                        Time of last solid consumption: 2359                        Date of last liquid consumption: 07/26/22                        Date of last solid food consumption: 07/26/22    BMI:   Wt Readings from Last 3 Encounters:   07/27/22 127 lb 2 oz (57.7 kg)     Body mass index is 19.91 kg/m².     CBC:   Lab Results   Component Value Date/Time    WBC 10.0 07/27/2022 08:57 AM    RBC 3.92 07/27/2022 08:57 AM    HGB 10.3 07/27/2022 08:57 AM    HCT 31.9 07/27/2022 08:57 AM    MCV 81.4 07/27/2022 08:57 AM    RDW 14.5 07/27/2022 08:57 AM     07/27/2022 08:57 AM       CMP:   Lab Results   Component Value Date/Time     07/27/2022 05:40 AM    K 3.4 07/27/2022 05:40 AM     07/27/2022 05:40 AM    CO2 24 07/27/2022 05:40 AM    BUN 10 07/27/2022 05:40 AM    CREATININE 0.63 07/27/2022 05:40 AM    GFRAA >60 07/27/2022 05:40 AM    LABGLOM >60 07/27/2022 05:40 AM    GLUCOSE 99 07/27/2022 05:40 AM    PROT 6.2 07/26/2022 11:36 AM    CALCIUM 7.9 07/27/2022 05:40 AM    BILITOT 0.92 07/26/2022 11:36 AM    ALKPHOS 91 07/26/2022 11:36 AM     07/26/2022 11:36 AM    ALT 52 07/26/2022 11:36 AM       POC Tests: No results for input(s): POCGLU, POCNA, POCK, POCCL, POCBUN, POCHEMO, POCHCT in the last 72 hours. Coags: No results found for: PROTIME, INR, APTT    HCG (If Applicable): No results found for: PREGTESTUR, PREGSERUM, HCG, HCGQUANT     ABGs: No results found for: PHART, PO2ART, YBA8VKM, SET7LSU, BEART, G7KLDSJH     Type & Screen (If Applicable):  No results found for: LABABO, LABRH    Drug/Infectious Status (If Applicable):  Lab Results   Component Value Date/Time    HEPCAB NONREACTIVE 07/25/2022 04:34 PM       COVID-19 Screening (If Applicable):   Lab Results   Component Value Date/Time    COVID19 Not Detected 07/23/2022 03:40 PM           Anesthesia Evaluation    Airway: Mallampati: I  TM distance: >3 FB   Neck ROM: full  Mouth opening: > = 3 FB   Dental:          Pulmonary:   (+) pneumonia:      (-) shortness of breath                           Cardiovascular:                      Neuro/Psych:               GI/Hepatic/Renal:             Endo/Other:                     Abdominal:             Vascular:           Other Findings:           Anesthesia Plan      general     ASA 3                                   Chet Angel MD   7/27/2022

## 2022-07-27 NOTE — PROGRESS NOTES
The patient is discharged home; awaiting his girlfriend to arrive to drive him home. The patient tolerated his dinner.

## 2022-07-27 NOTE — ADT AUTH CERT
Pneumonia - Care Day 4 (7/26/2022) by Debbie Andino RN       Review Status Review Entered   Completed 7/26/2022 13:56      Criteria Review      Care Day: 4 Care Date: 7/26/2022 Level of Care: Inpatient Floor    Guideline Day 2    Level Of Care    (X) Floor    Clinical Status    (X) * No CO2 retention or acidosis    (X) * No requirement for mechanical ventilation    (X) * Hypotension absent    ( ) * Afebrile or fever improved    7/26/2022 1:56 PM EDT by Jonna Motta      Temp max as of 1354- 100.2    (X) * No hypoxia on room air or oxygenation improved    (X) * Mental status improved or at baseline    Activity    (X) * Increased activity    Routes    (X) Oral medications    (X) Usual diet    Interventions    (X) Incentive spirometry    (X) Pulse oximetry    7/26/2022 1:56 PM EDT by Zulema Leal      Sats 96-97%    (X) Head of bed at 30 degrees    ( ) Possible oxygen    7/26/2022 1:56 PM EDT by Zulema Leal      RA    Medications    (X) IV or oral antibiotics    7/26/2022 1:56 PM EDT by Jonna Motta      cefTRIAXone (ROCEPHIN) IV 1,000 [id number]   And  azithromycin 500 [id number]    * Milestone   Additional Notes   DATE: 7/26/22         Pertinent Updates:   IV abiox continue   IVF @ 75/hr      Vitals:100.2 (37.9) 16 86 134/67  96% RA      Abnl/Pertinent Labs/Radiology/Diagnostic Studies:      7/26/22 04:06   Sodium: 134 (L)   GLUCOSE, FASTING,GF: 100 (H)   CALCIUM, SERUM, 590058: 7.9 (L)      7/26/22 10:21   Sodium: 132 (L)      7/26/22 11:36   Total Protein: 6.2 (L)   Albumin: 2.7 (L)   ALT: 52 (H)   AST: 158 (H)   Bilirubin, Direct: 0.53 (H)   Ferritin: 6,647 (H)      7/26/22 13:38   US LIVER:   1. So-called \"starry wendy\" appearance of the liver in association with mild   hepatomegaly, findings that be seen with acute hepatitis. However, the   findings are indeterminate and may be normal for the patient.    2. A questionable exophytic lesion measuring up to 4.3 cm may arise from the upper pole of the right kidney. Alternatively, the appearance could be   related to variant lobulation. Recommend further evaluation with renal   abdomen (preferred) or CT. Physical Exam:   Chest -decreased air entry at the bases to auscultation, normal effort   Heart - normal rate, regular rhythm, no murmurs   Abdomen - soft, mild epigastric tenderness, nondistended, bowel sounds present all four quadrants, no masses, hepatomegaly or splenomegaly. No hernias   Neurological - normal speech, no focal findings or movement disorder noted, cranial nerves II through XII grossly intact   Extremities - no pedal edema or calf pain with palpation      MD Consults/Assessments & Plans:      GI   Coffee-ground emesis   Melanotic stools   NSAID's use   Nausea vomiting   Mild anemia   Abdominal pains   Marijuana use   Plan:       1. Coffee-ground emesis with melena NSAID usage   2. Start PPI   3. Follow-up hemoglobin hematocrit   4. Treatment for pneumonia   5. Plan upper endoscopy on him tomorrow to evaluate   6. The Endoscopic procedure was explained to the patient in detail   7. The prep and NPO were explained   8. All the Risks, Benefits, and Alternatives were explained   9. Risk of Bleeding, Perforation and Cardio Respiratory risks were explained   10. his questions were answered   11. Keep him n.p.o. after midnight   -----------------      IM   Plan:   1. Community-acquired multifocal pneumonia   1. Continue Rocephin and Zithromax   2. Pulmonary hygiene as ordered   3. Transition to oral Levaquin at time of discharge   2. Occult stool positive for blood   1.  Discontinue NSAIDs   2. GI evaluation underway and plan for EGD/colonoscopy tomorrow         Medications:      Scheduled Medications   · nicotine 1 patch TransDERmal Daily   · sodium chloride flush 5-40 mL IntraVENous 2 times per day   · [Held by provider] enoxaparin 40 mg SubCUTAneous Daily   · cefTRIAXone (ROCEPHIN) IV 1,000 mg IntraVENous Q24H     And · azithromycin 500 mg Oral Q24H   · guaiFENesin 600 mg Oral BID   · ipratropium-albuterol 1 ampule Inhalation BID          Continuous Infusions:    · sodium chloride 75 mL/hr       PRN   acetaminophen (TYLENOL) tablet 650 mg Po x2   benzonatate (TESSALON) capsule 100 mg PO x1                            Pneumonia - Care Day 3 (7/25/2022) by Roseanna Holloway RN       Review Status Review Entered   Completed 7/26/2022 13:54      Criteria Review      Care Day: 3 Care Date: 7/25/2022 Level of Care: Inpatient Floor    Guideline Day 2    Level Of Care    (X) Floor    Clinical Status    (X) * No CO2 retention or acidosis    (X) * No requirement for mechanical ventilation    (X) * Hypotension absent    ( ) * Afebrile or fever improved    7/26/2022 1:54 PM EDT by Jonna Whitney      max temp 101.8    (X) * No hypoxia on room air or oxygenation improved    (X) * Mental status improved or at baseline    Activity    (X) * Increased activity    Routes    (X) Oral medications    (X) Usual diet    Interventions    (X) Incentive spirometry    (X) Pulse oximetry    7/26/2022 1:54 PM EDT by Robert Hernandez      sats 93-96%    (X) Head of bed at 30 degrees    Medications    (X) IV or oral antibiotics    7/26/2022 1:54 PM EDT by Jonna Whitney      cefTRIAXone (ROCEPHIN) IV 1,000 [id number]   And  ·azithromycin 500 [id number]    * Milestone   Additional Notes   DATE: 7/25/22         Relevant baselines: (lab values, vitals, o2 amount/delivery, etc.)         Pertinent Updates:   Still having cough with yellow expectoration   T-max 101.8   Sodium 128-129   IV abiox Rocephin / PO Zithromax   IVF @ 75ml/hr      Vitals:101.8 (38.8) 16 97 126/62    SpO2 94%         Abnl/Pertinent Labs/Radiology/Diagnostic Studies:   7/25/22 03:50   Sodium: 129 (L)   Chloride: 97 (L)   Bun/Cre Ratio: 21 (H)   GLUCOSE, FASTING,GF: 100 (H)   CALCIUM, SERUM, 842207: 7.9 (L)          7/25/22 09:59   Sodium: 128 (L)      7/25/22 10:40   CULTURE, agree, please place a new ADMIT order in [facility] as recommended. .   Name: Dallas Woodard   : 1990   CSN: [id number]   INSURANCE:   Clinical summary Assessment:       Hospital Problems         Last Modified POA     * (Principal) Multifocal pneumonia 2022 Yes     Hyponatremia 2022 Yes     Elevated LFTs 2022 Yes     Hypokalemia 2022 Yes           Plan:       1. Continue IV antibiotics with azithromycin and Rocephin   2. Continue Mucinex 600 mg 2 times daily and aerosols   3. DVT prophylaxis   4. Check urine for Legionella antigen   5. Hyponatremia work-up   6. Replace electrolytes as needed   7.  Check stool for C. difficile if gets diarrhea again   Vitals vss   Labs and Imaging reviewed   MCG criteria applies yes,   Comments Rec upgrade to inpt   This chart was reviewed at 9:22 AM 2022   [facility]   CELL : [phone]       Additional Notes

## 2022-07-27 NOTE — PROGRESS NOTES
states that his symptoms started approximately 5 days ago. Patient states that he also had a fever during that time, but did not check it because he does not have a thermometer. Patient states that he was taking Tylenol intermittently for the fever. The patient states that the fever would improve and then come right back. Patient has a productive cough with yellow sputum. Patient has been nauseated and vomiting for 2 days, and patient has not eaten or drank anything in 4 days. Of note, patient was evaluated at [facility] ER on 7/18/2022 and 7/19/2022. During those ED visits, patient's WBC count was 16.8 and 19.5. Patient's flu swab, rapid strep and COVID swab were both negative. Patient was discharged and advised to use Tylenol and Motrin for a viral upper respiratory illness. Patient has no significant past medical history and takes no medications at home. 7/26 -respiratory status mildly improved. We will continue broad-spectrum antibiotic coverage. GI evaluation completed and they plan for EGD/colonoscopy tomorrow. 7/27 - Condition is again improved overnight. Patient does not require supplemental oxygen. He reports far less shortness of breath and cough today. Blood cultures are negative x3 days. GI evaluation is underway and an EGD was completed without overt signs of hemorrhage. GI advised discontinuation of NSAIDs and outpatient follow-up. Internal medicine is comfortable with hospital discharge. Options for care discussed with the patient and he would like to proceed with an outpatient treatment modality.     Review of Systems:     Constitutional:  negative for chills, fevers, sweats  Respiratory: Positive for cough, dyspnea on exertion, shortness of breath, wheezing  Cardiovascular:  negative for chest pain, chest pressure/discomfort, lower extremity edema, palpitations  Gastrointestinal:  negative for abdominal pain, constipation, diarrhea, nausea, vomiting  Neurological: negative for dizziness, headache    Medications: Allergies:  No Known Allergies    Current Meds:   Scheduled Meds:    nicotine  1 patch TransDERmal Daily    sodium chloride flush  5-40 mL IntraVENous 2 times per day    [Held by provider] enoxaparin  40 mg SubCUTAneous Daily    cefTRIAXone (ROCEPHIN) IV  1,000 mg IntraVENous Q24H    guaiFENesin  600 mg Oral BID    ipratropium-albuterol  1 ampule Inhalation BID     Continuous Infusions:    sodium chloride Stopped (22 0112)    sodium chloride       PRN Meds: potassium chloride **OR** potassium alternative oral replacement **OR** potassium chloride, sodium chloride flush, sodium chloride, ondansetron **OR** ondansetron, magnesium hydroxide, acetaminophen, albuterol, benzonatate, acetaminophen    Data:     Past Medical History:   has no past medical history on file. Social History:   reports that he has been smoking cigarettes. He has been smoking an average of 1 pack per day. He has never used smokeless tobacco. He reports that he does not currently use alcohol. He reports that he does not currently use drugs. Family History:   Family History   Problem Relation Age of Onset    No Known Problems Mother     No Known Problems Father     No Known Problems Brother     No Known Problems Brother        Vitals:  /68   Pulse 76   Temp 98.5 °F (36.9 °C) (Oral)   Resp 22   Ht 5' 7\" (1.702 m)   Wt 127 lb 2 oz (57.7 kg)   SpO2 98%   BMI 19.91 kg/m²   Temp (24hrs), Av.3 °F (37.4 °C), Min:98.2 °F (36.8 °C), Max:101.8 °F (38.8 °C)    No results for input(s): POCGLU in the last 72 hours. I/O (24Hr):     Intake/Output Summary (Last 24 hours) at 2022 1518  Last data filed at 2022 1115  Gross per 24 hour   Intake 1619.66 ml   Output --   Net 1619.66 ml       Labs:  Hematology:  Recent Labs     22  0857   WBC 10.0   RBC 3.92*   HGB 10.3*   HCT 31.9*   MCV 81.4*   MCH 26.3   MCHC 32.3   RDW 14.5*      MPV 10.9     Chemistry:  Recent Labs     07/25/22  0350 07/25/22  0959 07/26/22  0406 07/26/22  1021 07/26/22  1555 07/26/22  2225 07/27/22  0540   *   < > 134*   < > 131* 137 137   K 3.8  --  3.7  --   --   --  3.4*   CL 97*  --  100  --   --   --  103   CO2 20  --  22  --   --   --  24   GLUCOSE 100*  --  100*  --   --   --  99   BUN 18  --  11  --   --   --  10   CREATININE 0.86  --  0.73  --   --   --  0.63*   ANIONGAP 12  --  12  --   --   --  10   LABGLOM >60  --  >60  --   --   --  >60   GFRAA >60  --  >60  --   --   --  >60   CALCIUM 7.9*  --  7.9*  --   --   --  7.9*    < > = values in this interval not displayed. Recent Labs     07/26/22  1136   PROT 6.2*   LABALBU 2.7*   *   ALT 52*   ALKPHOS 91   BILITOT 0.92   BILIDIR 0.53*     ABG:No results found for: POCPH, PHART, PH, POCPCO2, MCU4IBZ, PCO2, POCPO2, PO2ART, PO2, POCHCO3, HIL0NOC, HCO3, NBEA, PBEA, BEART, BE, THGBART, THB, YVN1NAM, SAGX2HLZ, I5LQOUPQ, O2SAT, FIO2  Lab Results   Component Value Date/Time    SPECIAL LAC 10ML 07/23/2022 04:40 PM     Lab Results   Component Value Date/Time    CULTURE NORMAL RESPIRATORY DESHAWN MODERATE GROWTH 07/25/2022 10:40 AM       Radiology:  XR CHEST (2 VW)    Result Date: 7/24/2022  Right mid and lower lung infiltrate consistent with pneumonia. XR CHEST PORTABLE    Result Date: 7/23/2022  Extensive consolidation along the medial right lung, suspicious for multifocal pneumonia. Recommend follow-up to resolution.        Physical Examination:        General appearance:  alert, cooperative and no distress  Mental Status:  oriented to person, place and time and normal affect  Lungs: Increased effort with exertion, rhonchi noted with a productive cough and upper bronchus has coarse auscultation  Heart:  regular rate and rhythm, no murmur  Abdomen:  soft, nontender, nondistended, normal bowel sounds, no masses, hepatomegaly, splenomegaly  Extremities:  no edema, redness, tenderness in the calves  Skin:  no gross lesions, rashes, induration    Assessment:        Hospital Problems             Last Modified POA    * (Principal) Multifocal pneumonia 7/23/2022 Yes    Hyponatremia 7/23/2022 Yes    Elevated LFTs 7/23/2022 Yes    Hypokalemia 7/24/2022 Yes    Diarrhea-dark liquid with occult blood positive 7/25/2022 Yes    Smoker 7/25/2022 Yes    Pneumonia of both lungs due to infectious organism 7/26/2022 Yes       Plan:        Community-acquired multifocal pneumonia  Transition IV antibiotics to oral Levaquin  Pulmonary hygiene as ordered  Occult stool positive for blood  Discontinue NSAIDs  GI evaluation underway and plan for outpatient follow-up for biopsy results in 2 weeks      NORA Nichols NP  7/27/2022  3:18 PM

## 2022-07-27 NOTE — RT PROTOCOL NOTE
RT Inhaler-Nebulizer Bronchodilator Protocol Note    There is a bronchodilator order in the chart from a provider indicating to follow the RT Bronchodilator Protocol and there is an Initiate RT Inhaler-Nebulizer Bronchodilator Protocol order as well (see protocol at bottom of note). CXR Findings:  No results found. The findings from the last RT Protocol Assessment were as follows:   History Pulmonary Disease: None or smoker <15 pack years  Respiratory Pattern: Dyspnea on exertion or RR 21-25 bpm  Breath Sounds: Slightly diminished and/or crackles  Cough: Strong, productive  Indication for Bronchodilator Therapy: Decreased or absent breath sounds  Bronchodilator Assessment Score: 5    Aerosolized bronchodilator medication orders have been revised according to the RT Inhaler-Nebulizer Bronchodilator Protocol below. Respiratory Therapist to perform RT Therapy Protocol Assessment initially then follow the protocol. Repeat RT Therapy Protocol Assessment PRN for score 0-3 or on second treatment, BID, and PRN for scores above 3. No Indications - adjust the frequency to every 6 hours PRN wheezing or bronchospasm, if no treatments needed after 48 hours then discontinue using Per Protocol order mode. If indication present, adjust the RT bronchodilator orders based on the Bronchodilator Assessment Score as indicated below. Use Inhaler orders unless patient has one or more of the following: on home nebulizer, not able to hold breath for 10 seconds, is not alert and oriented, cannot activate and use MDI correctly, or respiratory rate 25 breaths per minute or more, then use the equivalent nebulizer order(s) with same Frequency and PRN reasons based on the score. If a patient is on this medication at home then do not decrease Frequency below that used at home.     0-3 - enter or revise RT bronchodilator order(s) to equivalent RT Bronchodilator order with Frequency of every 4 hours PRN for wheezing or increased work of breathing using Per Protocol order mode. 4-6 - enter or revise RT Bronchodilator order(s) to two equivalent RT bronchodilator orders with one order with BID Frequency and one order with Frequency of every 4 hours PRN wheezing or increased work of breathing using Per Protocol order mode. 7-10 - enter or revise RT Bronchodilator order(s) to two equivalent RT bronchodilator orders with one order with TID Frequency and one order with Frequency of every 4 hours PRN wheezing or increased work of breathing using Per Protocol order mode. 11-13 - enter or revise RT Bronchodilator order(s) to one equivalent RT bronchodilator order with QID Frequency and an Albuterol order with Frequency of every 4 hours PRN wheezing or increased work of breathing using Per Protocol order mode. Greater than 13 - enter or revise RT Bronchodilator order(s) to one equivalent RT bronchodilator order with every 4 hours Frequency and an Albuterol order with Frequency of every 2 hours PRN wheezing or increased work of breathing using Per Protocol order mode. RT to enter RT Home Evaluation for COPD & MDI Assessment order using Per Protocol order mode.     Electronically signed by shelton yao RCP on 7/27/2022 at 9:41 AM

## 2022-07-28 ENCOUNTER — TELEPHONE (OUTPATIENT)
Dept: GASTROENTEROLOGY | Age: 32
End: 2022-07-28

## 2022-07-28 LAB
ANTI DNA DOUBLE STRANDED: <0.5 IU/ML
ANTI-NUCLEAR ANTIBODY (ANA): NEGATIVE
CULTURE: NORMAL
CULTURE: NORMAL
ENA ANTIBODIES SCREEN: 0.3 U/ML
LIVER-KIDNEY MICROSOMAL AB: NORMAL
Lab: NORMAL
Lab: NORMAL
MITOCHONDRIAL ANTIBODY: <0.5 U/ML (ref 0–4)
SMOOTH MUSCLE ANTIBODY: 12 UNITS (ref 0–19)
SPECIMEN DESCRIPTION: NORMAL
SPECIMEN DESCRIPTION: NORMAL

## 2022-07-28 NOTE — TELEPHONE ENCOUNTER
Pt called and lvm stating he just went to  his medications and he didn't get the medication for his stomach. Pt requesting a call back asap.

## 2022-07-29 LAB — SURGICAL PATHOLOGY REPORT: NORMAL

## 2022-08-08 ENCOUNTER — OFFICE VISIT (OUTPATIENT)
Dept: FAMILY MEDICINE CLINIC | Age: 32
End: 2022-08-08
Payer: MEDICAID

## 2022-08-08 VITALS
HEART RATE: 95 BPM | RESPIRATION RATE: 16 BRPM | DIASTOLIC BLOOD PRESSURE: 70 MMHG | TEMPERATURE: 98.6 F | OXYGEN SATURATION: 96 % | SYSTOLIC BLOOD PRESSURE: 120 MMHG | HEIGHT: 67 IN | BODY MASS INDEX: 19.15 KG/M2 | WEIGHT: 122 LBS

## 2022-08-08 DIAGNOSIS — Z13.29 THYROID DISORDER SCREENING: ICD-10-CM

## 2022-08-08 DIAGNOSIS — R93.429 ABNORMAL ULTRASOUND OF KIDNEY: ICD-10-CM

## 2022-08-08 DIAGNOSIS — Z13.220 LIPID SCREENING: ICD-10-CM

## 2022-08-08 DIAGNOSIS — Z13.1 DIABETES MELLITUS SCREENING: ICD-10-CM

## 2022-08-08 DIAGNOSIS — J18.9 PNEUMONIA OF BOTH LUNGS DUE TO INFECTIOUS ORGANISM, UNSPECIFIED PART OF LUNG: ICD-10-CM

## 2022-08-08 DIAGNOSIS — J18.9 MULTIFOCAL PNEUMONIA: Primary | ICD-10-CM

## 2022-08-08 DIAGNOSIS — F17.200 SMOKER: ICD-10-CM

## 2022-08-08 DIAGNOSIS — K29.71 GASTRITIS WITH HEMORRHAGE, UNSPECIFIED CHRONICITY, UNSPECIFIED GASTRITIS TYPE: ICD-10-CM

## 2022-08-08 PROCEDURE — 99204 OFFICE O/P NEW MOD 45 MIN: CPT | Performed by: NURSE PRACTITIONER

## 2022-08-08 RX ORDER — PANTOPRAZOLE SODIUM 40 MG/1
40 TABLET, DELAYED RELEASE ORAL
Qty: 90 TABLET | Refills: 1 | Status: SHIPPED | OUTPATIENT
Start: 2022-08-08

## 2022-08-08 SDOH — HEALTH STABILITY: PHYSICAL HEALTH: ON AVERAGE, HOW MANY DAYS PER WEEK DO YOU ENGAGE IN MODERATE TO STRENUOUS EXERCISE (LIKE A BRISK WALK)?: 5 DAYS

## 2022-08-08 SDOH — ECONOMIC STABILITY: FOOD INSECURITY: WITHIN THE PAST 12 MONTHS, THE FOOD YOU BOUGHT JUST DIDN'T LAST AND YOU DIDN'T HAVE MONEY TO GET MORE.: NEVER TRUE

## 2022-08-08 SDOH — ECONOMIC STABILITY: FOOD INSECURITY: WITHIN THE PAST 12 MONTHS, YOU WORRIED THAT YOUR FOOD WOULD RUN OUT BEFORE YOU GOT MONEY TO BUY MORE.: NEVER TRUE

## 2022-08-08 SDOH — HEALTH STABILITY: PHYSICAL HEALTH: ON AVERAGE, HOW MANY MINUTES DO YOU ENGAGE IN EXERCISE AT THIS LEVEL?: 30 MIN

## 2022-08-08 ASSESSMENT — PATIENT HEALTH QUESTIONNAIRE - PHQ9
1. LITTLE INTEREST OR PLEASURE IN DOING THINGS: 0
SUM OF ALL RESPONSES TO PHQ QUESTIONS 1-9: 0
SUM OF ALL RESPONSES TO PHQ QUESTIONS 1-9: 0
SUM OF ALL RESPONSES TO PHQ9 QUESTIONS 1 & 2: 0
SUM OF ALL RESPONSES TO PHQ QUESTIONS 1-9: 0
SUM OF ALL RESPONSES TO PHQ QUESTIONS 1-9: 0
2. FEELING DOWN, DEPRESSED OR HOPELESS: 0

## 2022-08-08 ASSESSMENT — SOCIAL DETERMINANTS OF HEALTH (SDOH)
WITHIN THE LAST YEAR, HAVE YOU BEEN AFRAID OF YOUR PARTNER OR EX-PARTNER?: NO
HOW HARD IS IT FOR YOU TO PAY FOR THE VERY BASICS LIKE FOOD, HOUSING, MEDICAL CARE, AND HEATING?: NOT HARD AT ALL
WITHIN THE LAST YEAR, HAVE YOU BEEN KICKED, HIT, SLAPPED, OR OTHERWISE PHYSICALLY HURT BY YOUR PARTNER OR EX-PARTNER?: NO
WITHIN THE LAST YEAR, HAVE TO BEEN RAPED OR FORCED TO HAVE ANY KIND OF SEXUAL ACTIVITY BY YOUR PARTNER OR EX-PARTNER?: NO
WITHIN THE LAST YEAR, HAVE YOU BEEN HUMILIATED OR EMOTIONALLY ABUSED IN OTHER WAYS BY YOUR PARTNER OR EX-PARTNER?: NO

## 2022-08-08 ASSESSMENT — ENCOUNTER SYMPTOMS
VOMITING: 0
COUGH: 1
SINUS PAIN: 0
EYE PAIN: 0
BACK PAIN: 0
SORE THROAT: 0
NAUSEA: 0
ABDOMINAL PAIN: 0
DIARRHEA: 0
SHORTNESS OF BREATH: 0

## 2022-08-08 NOTE — PROGRESS NOTES
Visit Information    Have you changed or started any medications since your last visit including any over-the-counter medicines, vitamins, or herbal medicines? no   Are you having any side effects from any of your medications? -  no  Have you stopped taking any of your medications? Is so, why? -  no    Have you seen any other physician or provider since your last visit? No  Have you had any other diagnostic tests since your last visit? No  Have you been seen in the emergency room and/or had an admission to a hospital since we last saw you? Yes - Records Obtained  Have you had your routine dental cleaning in the past 6 months? no    Have you activated your [employer] account? If not, what are your barriers?  Yes     Patient Care Team:  NORA Lund CNP as PCP - General (Certified Nurse Practitioner)    Medical History Review  Past Medical, Family, and Social History reviewed and does contribute to the patient presenting condition    Health Maintenance   Topic Date Due    COVID-19 Vaccine (1) Never done    Varicella vaccine (1 of 2 - 2-dose childhood series) Never done    Pneumococcal 0-64 years Vaccine (1 - PCV) Never done    Depression Screen  Never done    HIV screen  Never done    DTaP/Tdap/Td vaccine (1 - Tdap) Never done    Flu vaccine (1) 09/01/2022    Hepatitis C screen  Completed    Hepatitis A vaccine  Aged Out    Hepatitis B vaccine  Aged Out    Hib vaccine  Aged Out    Meningococcal (ACWY) vaccine  Aged Out

## 2022-08-08 NOTE — PROGRESS NOTES
[facility]  [address] [address]  Dept: [phone]  Dept Fax: [phone]    Hilda Marin is a 33 y.o. male who presents today for his medicalconditions/complaints as noted below. Hilda Marin is c/o of Pneumonia (New patient appointment to discuss recent hospitalization)      HPI:         36-year-old male patient presents with concern for new patient appointment, hospital follow-up    Patient reportedly had a virus, upper respiratory infection beginning on 7/18. Patient went to the emergency room twice, subsequently was admitted at [facility] from 7 23-7 27. Patient was found to have multifocal pneumonia managed with antibiotics. Daily smoker of cigarettes and marijuana. Additionally patient was found to have gastritis found on EGD without evidence of acute hemorrhage. Was to be started on PPI    Abnormal liver us did show renal lesion, recommended ct          History reviewed. No pertinent past medical history. Current Outpatient Medications   Medication Sig Dispense Refill    pantoprazole (PROTONIX) 40 MG tablet Take 1 tablet by mouth every morning (before breakfast) 90 tablet 1    albuterol sulfate HFA (PROVENTIL HFA) 108 (90 Base) MCG/ACT inhaler Inhale 2 puffs into the lungs every 6 hours as needed for Wheezing 18 g 3    predniSONE (DELTASONE) 20 MG tablet Take 20 mg by mouth in the morning and 20 mg before bedtime. For 5 days. acetaminophen (TYLENOL) 325 MG tablet Take 650 mg by mouth every 4 hours as needed for Pain (Patient not taking: Reported on 8/8/2022)       No current facility-administered medications for this visit. No Known Allergies    Subjective:      Review of Systems   Constitutional:  Negative for chills and fatigue. HENT:  Positive for congestion. Negative for ear pain, sinus pain and sore throat. Eyes:  Negative for pain and visual disturbance.    Respiratory:  Positive for Differential; Future  -     Lipid Panel; Future  5. Thyroid disorder screening  -     CBC with Auto Differential; Future  -     TSH With Reflex Ft4; Future  6. Diabetes mellitus screening  -     CBC with Auto Differential; Future  -     Comprehensive Metabolic Panel; Future  -     Hemoglobin A1C; Future  7. Gastritis with hemorrhage, unspecified chronicity, unspecified gastritis type  -     CBC with Auto Differential; Future  -     pantoprazole (PROTONIX) 40 MG tablet; Take 1 tablet by mouth every morning (before breakfast), Disp-90 tablet, R-1Normal  8. Abnormal ultrasound of kidney  -     CBC with Auto Differential; Future  -     CT ABDOMEN PELVIS W IV CONTRAST Additional Contrast? None; Future               No results found for this visit on 08/08/22. Protonix sent  Recheck chest xray  Recommend pft to eval for rad  Recommend ct given abnormal liver us and renal lesion  Labs ordered  Recheck in 3 months, sooner prn        Return if symptoms worsen or fail to improve. Orders Placed This Encounter   Medications    pantoprazole (PROTONIX) 40 MG tablet     Sig: Take 1 tablet by mouth every morning (before breakfast)     Dispense:  90 tablet     Refill:  1        Patient given educational materials - see patient instructions. Discussed use, benefit, and side effects of prescribed medications. All patientquestions answered. Pt voiced understanding. Patient given educational materials - see patient instructions. Discussed use, benefit, and side effects of prescribed medications. All patientquestions answered. Pt voiced understanding. This note was transcribed using dictation with Dragon services. Efforts were made to correct any errors but some words may be misinterpreted.     Patient assumes risks associated with failure to complete recommended testing and treatments in a timely manner    Electronically signed by NORA Garcia CNP on 8/8/2022at 3:08 PM

## 2022-08-08 NOTE — LETTER
[address] [place] [address]  Phone: [phone]  Fax: [phone]    NORA Box CNP        August 8, 2022     Patient: Alok Cantor   YOB: 1990   Date of Visit: 8/8/2022       To Whom It May Concern: It is my medical opinion that Alok Cantor is cleared to return to work on 8/9/22. If you have any questions or concerns, please don't hesitate to call.     Sincerely,        NORA Box CNP

## 2022-08-08 NOTE — PATIENT INSTRUCTIONS
Patient Education        Deciding About Using Medicines To Quit Smoking  How can you decide about using medicines to quit smoking? What are the medicines you can use? Your doctor may prescribe varenicline (Chantix) or bupropion (Zyban). These medicines can help you cope with cravings for tobacco. They are pills thatdon't contain nicotine. You also can use nicotine replacement products. These do contain nicotine. There are many types. Gum and lozenges slowly release nicotine into your mouth. Patches stick to your skin. They slowly release nicotine into your bloodstream.  An inhaler has a hunt that contains nicotine. You breathe in a puff of nicotine vapor through your mouth and throat. Nasal spray releases a mist that contains nicotine. What are key points about this decision? Using medicines can double your chances of quitting smoking. They can ease cravings and withdrawal symptoms. Getting counseling along with using medicine can raise your chances of quitting even more. If you smoke fewer than 5 cigarettes a day, you may not need medicines to help you quit smoking. These medicines have less nicotine than cigarettes. And by itself, nicotine is not nearly as harmful as smoking. The tars, carbon monoxide, and other toxic chemicals in tobacco cause the harmful effects. The side effects of nicotine replacement products depend on the type of product. For example, a patch can make your skin red and itchy. Medicines in pill form can make you sick to your stomach. They can also cause dry mouth and trouble sleeping. For most people, the side effects are not bad enough to make them stop using the products. Why might you choose to use medicines to quit smoking? You have tried on your own to stop smoking, but you were not able to stop. You smoke more than 5 cigarettes a day. You want to increase your chances of quitting smoking. You want to reduce your cravings and withdrawal symptoms.   You feel the benefits of medicine outweigh the side effects. Why might you choose not to use medicine? You want to try quitting on your own by stopping all at once (\"cold turkey\"). You want to cut back slowly on the number of cigarettes you smoke. You smoke fewer than 5 cigarettes a day. You do not like using medicine. You feel the side effects of medicines outweigh the benefits. You are worried about the cost of medicines. Your decision  Thinking about the facts and your feelings can help you make a decision that is right for you. Be sure you understand the benefits and risks of your options,and think about what else you need to do before you make the decision. Where can you learn more? Go to https://EdutorpeZweemie.uromovie. org and sign in to your [employer] account. Enter K803 in the [employer] box to learn more about \"Deciding About Using Medicines To Quit Smoking. \"     If you do not have an account, please click on the \"Sign Up Now\" link. Current as of: October 28, 2021               Content Version: 13.3  © 2006-2022 Healthwise, Incorporated. Care instructions adapted under license by [facility] ([facility]). If you have questions about a medical condition or this instruction, always ask your healthcare professional. [place] [address] any warranty or liability for your use of this information.

## 2022-08-10 ENCOUNTER — TELEPHONE (OUTPATIENT)
Dept: FAMILY MEDICINE CLINIC | Age: 32
End: 2022-08-10

## 2022-08-10 DIAGNOSIS — J18.9 MULTIFOCAL PNEUMONIA: Primary | ICD-10-CM

## 2022-08-10 NOTE — TELEPHONE ENCOUNTER
----- Message from Farnaz Kay sent at 8/10/2022  2:10 PM EDT -----  Subject: Message to Provider    QUESTIONS  Information for Provider? Tala Watkins called today that say that his return   back to work letter, needs also to read that he may return back to work   w/o restrictions. If there are further questions. Please reach out to   Tala Watkins. Attn? Mary Ellen Frances- fax [phone] Thank you,   ---------------------------------------------------------------------------  --------------  Carrie ESPINAL  [phone]; OK to leave message on voicemail  ---------------------------------------------------------------------------  --------------  SCRIPT ANSWERS  Relationship to Patient?  Self

## 2023-09-12 ENCOUNTER — TELEPHONE (OUTPATIENT)
Dept: FAMILY MEDICINE CLINIC | Age: 33
End: 2023-09-12

## 2023-09-12 NOTE — TELEPHONE ENCOUNTER
Rhonda Yancey was contacted by a Mami Archuleta to discuss a referral for SDOH related needs. Writer spoke with: Patient and explained the services and assistance that can be provided through the [employer]ami Archuleta program.     Patient agreeable to receiving resources and support from Silvia Salazar. Intake Notes: Pt reports he needs help with transportation to medical appointments. Discussed the fact that pt still has [place] listed on his chart, but he currently lives in [place]. 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 Writer encouraged pt to call the [facility] office in [facility] to cancel his benefits since he no longer qualifies. Provided phone number. Pt states he will call tomorrow.     Plan of Care:  cancel [place] Medicaid, link pt with [employer] [place] ([facility]) transportation assistance    Action steps to be completed by writer:  email [employer] to pt, as requested (confirmed email address in chart is correct ), f/u with pt    Action steps to be completed by patient:  call [facility], review transportation info    Patient has given verbal permission to leave detailed messages regarding SDOH referral on their phone: N/A    Patient has given verbal permission to submit applications on their behalf: N/A    Patient voiced understanding of action plan and responsibilities, was provided with writer's contact information, and agrees to call should they require additional assistance: yes      Rinku

## 2023-09-12 NOTE — TELEPHONE ENCOUNTER
Dodie Calderon,    I was reviewing my information about the transportation program I had planned to refer you to and unfortunately new clients have been paused until at least September 18th. I can mail you a few [employer] bus passes if that would be helpful and will check if the program reopens next week. Just let me know. Best wishes,    MADDY Winkler, [facility] (she/her)  [facility], [address] [place] [address] Drive  Phone: [phone]  Fax: [phone]  [email or web address]      [Image description: blue and green [employer] logo]

## 2023-09-19 NOTE — TELEPHONE ENCOUNTER
Evelyne Watermans was contacted by Tre Cheema, cassidy Archuleta, regarding a Social Determinants of Health referral.     A message was left with the writer's contact information. Follow-up attempt.

## 2024-03-14 ENCOUNTER — OFFICE VISIT (OUTPATIENT)
Dept: FAMILY MEDICINE CLINIC | Age: 34
End: 2024-03-14

## 2024-03-14 VITALS
OXYGEN SATURATION: 98 % | WEIGHT: 135.2 LBS | SYSTOLIC BLOOD PRESSURE: 116 MMHG | HEART RATE: 81 BPM | TEMPERATURE: 97.6 F | BODY MASS INDEX: 21.22 KG/M2 | DIASTOLIC BLOOD PRESSURE: 80 MMHG | HEIGHT: 67 IN

## 2024-03-14 DIAGNOSIS — F17.200 SMOKER: ICD-10-CM

## 2024-03-14 DIAGNOSIS — Z13.1 DIABETES MELLITUS SCREENING: ICD-10-CM

## 2024-03-14 DIAGNOSIS — R06.2 WHEEZING: ICD-10-CM

## 2024-03-14 DIAGNOSIS — Z00.00 WELL ADULT EXAM: Primary | ICD-10-CM

## 2024-03-14 DIAGNOSIS — Z13.220 LIPID SCREENING: ICD-10-CM

## 2024-03-14 DIAGNOSIS — Z13.29 THYROID DISORDER SCREEN: ICD-10-CM

## 2024-03-14 PROCEDURE — 99395 PREV VISIT EST AGE 18-39: CPT | Performed by: NURSE PRACTITIONER

## 2024-03-14 RX ORDER — ALBUTEROL SULFATE 90 UG/1
2 AEROSOL, METERED RESPIRATORY (INHALATION) EVERY 6 HOURS PRN
Qty: 18 G | Refills: 3 | Status: SHIPPED | OUTPATIENT
Start: 2024-03-14

## 2024-03-14 ASSESSMENT — ENCOUNTER SYMPTOMS
DIARRHEA: 0
EYE PAIN: 0
COUGH: 1
SORE THROAT: 0
VOMITING: 0
ABDOMINAL PAIN: 0
SHORTNESS OF BREATH: 0
BACK PAIN: 0
SINUS PAIN: 0
NAUSEA: 0

## 2024-03-14 NOTE — PROGRESS NOTES
Visit Information    Have you changed or started any medications since your last visit including any over-the-counter medicines, vitamins, or herbal medicines? no   Have you stopped taking any of your medications? Is so, why? -  no  Are you having any side effects from any of your medications? - no    Have you seen any other physician or provider since your last visit?  no   Have you had any other diagnostic tests since your last visit?  no   Have you been seen in the emergency room and/or had an admission in a hospital since we last saw you?  no   Have you had your routine dental cleaning in the past 6 months?  no     Do you have an active MyChart account? If no, what is the barrier?  Yes    Patient Care Team:  Jeff Law APRN - CNP as PCP - General (Certified Nurse Practitioner)  Jeff Law APRN - CNP as PCP - Empaneled Provider    Medical History Review  Past Medical, Family, and Social History reviewed and  contribute to the patient presenting condition    Health Maintenance   Topic Date Due    Hepatitis B vaccine (1 of 3 - 3-dose series) Never done    COVID-19 Vaccine (1) Never done    Varicella vaccine (1 of 2 - 2-dose childhood series) Never done    Pneumococcal 0-64 years Vaccine (1 - PCV) Never done    HIV screen  Never done    DTaP/Tdap/Td vaccine (1 - Tdap) Never done    Flu vaccine (1) Never done    Depression Screen  01/19/2025    Hepatitis C screen  Completed    Hepatitis A vaccine  Aged Out    Hib vaccine  Aged Out    HPV vaccine  Aged Out    Polio vaccine  Aged Out    Meningococcal (ACWY) vaccine  Aged Out

## 2024-03-14 NOTE — PROGRESS NOTES
[facility]  [facility]  [address] [facility] [address]  Dept: [phone]  Dept Fax: [phone]    Wilfrido Liao is a 34 y.o. male who presents today for his medicalconditions/complaints as noted below.  Wilfrido Liao is c/o of Annual Exam      HPI:     34 y.o male presents for well adult exam    Hx of pneumonia, wheezing. Use of albuterol prn, current smoker working on dose reduction.               No past medical history on file.     Current Outpatient Medications   Medication Sig Dispense Refill    albuterol sulfate HFA (PROVENTIL HFA) 108 (90 Base) MCG/ACT inhaler Inhale 2 puffs into the lungs every 6 hours as needed for Wheezing 18 g 3    pantoprazole (PROTONIX) 40 MG tablet Take 1 tablet by mouth every morning (before breakfast) (Patient not taking: Reported on 3/14/2024) 90 tablet 1    acetaminophen (TYLENOL) 325 MG tablet Take 650 mg by mouth every 4 hours as needed for Pain (Patient not taking: Reported on 8/8/2022)      predniSONE (DELTASONE) 20 MG tablet Take 20 mg by mouth in the morning and 20 mg before bedtime. For 5 days. (Patient not taking: Reported on 3/14/2024)       No current facility-administered medications for this visit.     No Known Allergies    Subjective:      Review of Systems   Constitutional:  Negative for chills and fatigue.   HENT:  Negative for congestion, ear pain, sinus pain and sore throat.    Eyes:  Negative for pain and visual disturbance.   Respiratory:  Positive for cough. Negative for shortness of breath.    Cardiovascular:  Negative for chest pain and palpitations.   Gastrointestinal:  Negative for abdominal pain, diarrhea, nausea and vomiting.   Genitourinary:  Negative for penile pain and testicular pain.   Musculoskeletal:  Negative for back pain, joint swelling and neck pain.   Skin:  Negative for rash.   Neurological:  Negative for dizziness and light-headedness.   Hematological:  Does not

## 2024-03-14 NOTE — PATIENT INSTRUCTIONS
Patient Education        Deciding About Using Medicines To Quit Smoking  How can you decide about using medicines to quit smoking?  What are the medicines you can use?  Your doctor may prescribe varenicline (Chantix) or bupropion SR. These medicines can help you cope with cravings for tobacco. They are pills that don't contain nicotine.  You also can use nicotine replacement products. These do contain nicotine. There are many types.  Gum and lozenges slowly release nicotine into your mouth.  Patches stick to your skin. They slowly release nicotine into your bloodstream.  An inhaler has a hunt that contains nicotine. You breathe in a puff of nicotine vapor through your mouth and throat.  Nasal spray releases a mist that contains nicotine.  What are key points about this decision?  Using medicines can increase your chances of quitting smoking. They can ease cravings and withdrawal symptoms.  Getting counseling along with using medicine can raise your chances of quitting even more.  These nicotine replacement products have less nicotine than cigarettes. And by itself, nicotine is not nearly as harmful as smoking. The tars, carbon monoxide, and other toxic chemicals in tobacco cause the harmful effects.  The side effects of nicotine replacement products depend on the type of product. For example, a patch can make your skin red and itchy. Medicines in pill form can make you sick to your stomach. They can also cause dry mouth and trouble sleeping. For most people, the side effects are not bad enough to make them stop using the products.  Why might you choose to use medicines to quit smoking?  You have tried on your own to stop smoking, but you were not able to stop.  You want to increase your chances of quitting smoking.  You want to reduce your cravings and withdrawal symptoms.  You feel the benefits of medicine outweigh the side effects.  Why might you choose not to use medicine?  You want to try quitting on your own

## (undated) DEVICE — CO2 CANNULA,SUPERSOFT, ADLT,7'O2,7'CO2: Brand: MEDLINE

## (undated) DEVICE — GLOVE SURG 8 11.7IN BEAD CUF LIGHT BRN SENSICARE LTX FREE

## (undated) DEVICE — JELLY,LUBE,STERILE,FLIP TOP,TUBE,2-OZ: Brand: MEDLINE

## (undated) DEVICE — BLOCK BITE 60FR RUBBER ADLT DENTAL

## (undated) DEVICE — BASIN EMSIS 700ML GRAPHITE PLAS KID SHP GRAD

## (undated) DEVICE — SINGLE-USE BIOPSY FORCEPS: Brand: RADIAL JAW 4

## (undated) DEVICE — Device: Brand: DEFENDO VALVE AND CONNECTOR KIT

## (undated) DEVICE — MEDICINE CUP, GRADUATED, STER: Brand: MEDLINE

## (undated) DEVICE — GAUZE,SPONGE,4"X4",16PLY,STRL,LF,10/TRAY: Brand: MEDLINE